# Patient Record
Sex: FEMALE | Race: BLACK OR AFRICAN AMERICAN | NOT HISPANIC OR LATINO | Employment: FULL TIME | URBAN - METROPOLITAN AREA
[De-identification: names, ages, dates, MRNs, and addresses within clinical notes are randomized per-mention and may not be internally consistent; named-entity substitution may affect disease eponyms.]

---

## 2017-02-06 ENCOUNTER — OFFICE VISIT (OUTPATIENT)
Dept: INTERNAL MEDICINE | Facility: CLINIC | Age: 22
End: 2017-02-06
Payer: COMMERCIAL

## 2017-02-06 VITALS
HEART RATE: 89 BPM | SYSTOLIC BLOOD PRESSURE: 142 MMHG | HEIGHT: 60 IN | DIASTOLIC BLOOD PRESSURE: 82 MMHG | WEIGHT: 178 LBS | BODY MASS INDEX: 34.95 KG/M2

## 2017-02-06 DIAGNOSIS — R03.0 BORDERLINE HYPERTENSION: ICD-10-CM

## 2017-02-06 DIAGNOSIS — T78.40XA ALLERGY, INITIAL ENCOUNTER: Primary | ICD-10-CM

## 2017-02-06 DIAGNOSIS — Z20.7 SCABIES EXPOSURE: ICD-10-CM

## 2017-02-06 PROCEDURE — 99999 PR PBB SHADOW E&M-EST. PATIENT-LVL III: CPT | Mod: PBBFAC,,, | Performed by: INTERNAL MEDICINE

## 2017-02-06 PROCEDURE — 99203 OFFICE O/P NEW LOW 30 MIN: CPT | Mod: S$GLB,,, | Performed by: INTERNAL MEDICINE

## 2017-02-06 RX ORDER — METHYLPREDNISOLONE 4 MG/1
TABLET ORAL
Qty: 1 PACKAGE | Refills: 0 | Status: SHIPPED | OUTPATIENT
Start: 2017-02-06 | End: 2017-04-04 | Stop reason: ALTCHOICE

## 2017-02-06 RX ORDER — IVERMECTIN 3 MG/1
15 TABLET ORAL ONCE
Qty: 10 TABLET | Refills: 0 | Status: SHIPPED | OUTPATIENT
Start: 2017-02-06 | End: 2017-02-06

## 2017-02-06 NOTE — MR AVS SNAPSHOT
[facility]  [address]  [place] [address]  Phone: [phone]  Fax: [phone]                  Jyothi Asif   2017 11:15 AM   Office Visit    Description:  Female : 1995   Provider:  Letha Gonzalez MD   Department:  [facility]           Reason for Visit     Rash           Diagnoses this Visit        Comments    Allergy, initial encounter    -  Primary     Scabies exposure                To Do List           Future Appointments        Provider Department Dept Phone    2017 12:00 PM LAB, SAME DAY [facility] [phone]    3/7/2017 8:00 AM Sherly Chavez MD [facility] All[facility]/ Immunology [phone]    2017 1:00 PM Isabel Gaviria MD [facility] [phone]      Goals (5 Years of Data)     None       These Medications        Disp Refills Start End    ivermectin (STROMECTOL) 3 mg Tab 10 tablet 0 2017    Take 5 tablets (15 mg total) by mouth once. Take 15 mg today then repeat dose in 2 weeks - Oral    Pharmacy: [facility]/pharmacy #[id number] - [place] - [address] Ph #: [phone]       methylPREDNISolone (MEDROL, MENDEL,) 4 mg tablet 1 Package 0 2017     use as directed    Pharmacy: [facility]/pharmacy #[id number] - [place], [place] - [address] [facility] Ph #: [phone]         [facility] On Call     [facility] On Call Nurse Care Line -  Assistance  Registered nurses in the [facility] provide clinical advisement, health education, appointment booking, and other advisory services.  Call for this free service at [phone].             Medications           Message regarding Medications     Verify the changes and/or additions to your medication regime listed below are the same as discussed with your clinician today.  If any of these changes or additions are incorrect, please notify your healthcare provider.        START taking these NEW medications         Refills    ivermectin (STROMECTOL) 3 mg Tab 0    Sig: Take 5 tablets (15 mg total) by mouth once. Take 15 mg today then repeat dose in 2 weeks    Class: Normal    Route: Oral    methylPREDNISolone (MEDROL, MENDEL,) 4 mg tablet 0    Sig: use as directed    Class: Normal      STOP taking these medications     tramadol (ULTRAM) 50 mg tablet     clindamycin (CLEOCIN) 300 MG capsule     nystatin (MYCOSTATIN) cream Apply topically 2 (two) times daily. APPLY TO AFFECTED AREA BID. Large area- 2 tube    clotrimazole-betamethasone 1-0.05% (LOTRISONE) cream Apply to affected area 2 times daily    clobetasol (TEMOVATE) 0.05 % cream Apply topically 2 (two) times daily. Large area- please supply with two tubes           Verify that the below list of medications is an accurate representation of the medications you are currently taking.  If none reported, the list may be blank. If incorrect, please contact your healthcare provider. Carry this list with you in case of emergency.           Current Medications     noreth-ethinyl estradiol-iron 0.8mg-25mcg(24) and 75 mg (4) Chew TAKE 1 TABLET BY MOUTH ONCE DAILY.    ivermectin (STROMECTOL) 3 mg Tab Take 5 tablets (15 mg total) by mouth once. Take 15 mg today then repeat dose in 2 weeks    methylPREDNISolone (MEDROL, MENDEL,) 4 mg tablet use as directed           Clinical Reference Information           Your Vitals Were     BP Pulse Height Weight BMI    142/82 89 5' (1.524 m) 80.7 kg (178 lb) 34.76 kg/m2      Blood Pressure          Most Recent Value    BP  (!)  142/82      Allergies as of 2/6/2017     No Known Allergies      Immunizations Administered on Date of Encounter - 2/6/2017     None      Orders Placed During Today's Visit      Normal Orders This Visit    Ambulatory consult to Allergy     Future Labs/Procedures Expected by Expires    CBC auto differential  2/6/2017 2/6/2018    Comprehensive metabolic panel  2/6/2017 2/6/2018    THYROID PEROXIDASE ANTIBODY  2/6/2017 4/7/2018    TSH   2/6/2017 2/6/2018      [facility] Sign-Up     Activating your [facility] account is as easy as 1-2-3!     1) Visit [email or web address], select Sign Up Now, enter this activation code and your date of birth, then select Next.  [email or web address]  Expires: 3/23/2017 11:50 AM      2) Create a username and password to use when you visit [facility] in the future and select a security question in case you lose your password and select Next.    3) Enter your e-mail address and click Sign Up!    Additional Information  If you have questions, please e-mail [email or web address] or call [phone] to talk to our [facility] staff. Remember, [facility] is NOT to be used for urgent needs. For medical emergencies, dial 911.         Instructions      Controlling Allergens: Dust Mites  Constant exposure to allergens means constant allergy symptoms. Thats why controlling or avoiding the allergens that cause your symptoms is an important part of your treatment. If you are allergic to dust mites, the tips below can help to lessen your exposure to dust mites.     Wash all bedding in hot water.   Dust mite allergy  Dust mites are a common cause of nasal allergies. These mites are tiny organisms that live in bedding, upholstered furniture, and carpet. They live in warm, humid conditions. House-dust mites are almost impossible to get rid of. But you can keep them under control.  Make changes to your home  Some furniture, like sofas and chairs, hold dust mites. To lessen the problem:  · Choose nonfabric upholstery, like leather or vinyl.  · Replace horizontal blinds with pull-down shades or vertical blinds.  · Use washable curtains instead of heavy drapes.  · Have as little carpeting as possible.  · Cover your mattress, box spring, and pillows in allergy-proof casings.  Housecleaning  Here are some tips:  · Wash sheets, blankets, and mattress pads every 1 to 2 weeks in hot water (at least 130°F).  · Remove stuffed animals and other things that  collect dust, such as wall hangings, knickknacks, and books--especially in the bedroom.  · Dust your home every week with a damp cloth. Vacuum once a week. Use HEPA (high efficiency particulate air) filters or double-ply bags in the vacuum . Or, use a vacuum designed to lessen allergens.  · If someone else cant dust and vacuum for you, wearing a filter mask may help.  Reduce indoor humidity  Dust mites need moist air to live. Use a dehumidifier to reduce air moisture. Dont use humidifiers, or vaporizers.  Talk with your healthcare provider about other ways to reduce dust in your home. Ask about medicines that can help with your allergy symptoms.  Date Last Reviewed: 9/1/2016 © 2000-2016 [employer]. [address] [place] [address], [place] [address]. All rights reserved. This information is not intended as a substitute for professional medical care. Always follow your healthcare professional's instructions.        Controlling Allergens: In the Home  Even a clean home can be full of allergens, so take a moment to see what you can do to cut down on allergens in each room of your home. Try to avoid things like cigarette smoke and perfume. They can irritate your eyes, nose, throat, and lungs and make your allergies worse.  · Buy an air purifier with a HEPA filter. Look in consumer magazines for recommendations. Avoid vaporizers and humidifiers, since they encourage mold and dust-mite growth.  · Use shades or vertical blinds instead of horizontal blinds, which collect dust. Replace drapes with curtains that can be washed regularly.  · Enclose mattresses, box springs, and pillows in allergy-proof casings. Use washable blankets and quilts. Avoid feather pillows, down comforters, and wool blankets.  · Avoid dust-catching clutter. Have enclosed places to keep books, toys, and clothes. Keep closet doors closed.  · Use washable throw rugs wherever possible, or have bare floors.  · Put filters over forced-air  heating vents. Change the filters regularly.  · Keep your car clean. Vacuum the seats and carpets regularly. If you have air conditioning, use it instead of opening the windows.  · Keep rain gutters clean. Remove leaves and debris that can grow mold.  · Check stored food for spoilage and mold growth. Clean up spills right away.  · Don't let wet clothing sit and grow mold. And don't hang clothes outside to dry where they can collect airborne pollen. Dry clothing immediately in a clothes dryer that's vented to the outside.  · Install a fan to keep the bathroom well ventilated.        Avoid yard work and pulling weeds. These and other outdoor activities increase your exposure to pollen. If thats not possible, wear a filter mask. When youre done, bathe, wash your hair, and change your clothes.      Date Last Reviewed: 9/1/2016  © 7834-3053 [employer]. [address] [place] [address]. All rights reserved. This information is not intended as a substitute for professional medical care. Always follow your healthcare professional's instructions.        Allergy Medicines: Over-the-Counter    You can buy many allergy medicines without a prescription. You may:  · Use the over-the-counter (OTC) alone or with prescription medicine  · Use all medicines exactly as instructed  · If you have any questions about your allergy medicine, ask your healthcare provider or your pharmacist  There are many OTC allergy medicines including antihistamines, decongestants, and steroid nasal spray. And, there are combination products. For example, a pill with both an antihistamine and a decongestant. You may also be instructed to take more than one medicine. For example, a steroid nasal spray and an antihistamine nasal spray.  Antihistamines  Antihistamines block the release of histamine, the substance released by your body that causes many allergy symptoms. They help lessen sneezing, itching, and runny  noses.   Antihistamines:  · Are available as pills, liquids, and nasal sprays.  · May cause you to be sleepy  · Should be taken as instructed  Decongestants  Decongestants reduce swelling of the nasal passages. They relieve pressure and pain in your sinuses.  Decongestants:  · Are available as pills, liquids, and nasal sprays  · Should not be used for more than a few days. Overuse can actually make your symptoms worse.  Steroid nasal sprays  Steroid nasal sprays are used for nasal allergy symptoms. They help to lessen nasal congestion and swelling, runny noses, and sneezing.  Steroid nasal sprays:  · Shouldn't be used without your provider's advice  · Can cause side effects, like nasal bleeding  · Should be sprayed into the nose correctly  Make sure you read and follow the instructions on the label. If you have any questions about these medicines, ask your healthcare provider or pharmacist.  Date Last Reviewed: 9/1/2016  © 4210-0918 [employer]. [address] [place] [address], [place] [address]. All rights reserved. This information is not intended as a substitute for professional medical care. Always follow your healthcare professional's instructions.        Scabies     To prevent spread of infection, wash clothing, linens, and toys in very hot water.     Scabies is an infection caused by very tiny mites that burrow into the skin. The mites are called Sarcoptes scabiei. They cause severe itching. Though children are most commonly infected, anyone can get scabies. Scabies mites can pass from person to person through close physical contact. They can also be passed through shared clothing, towels, and bedding. Scabies infection is not usually dangerous, but it is uncomfortable. Because it is so contagious, scabies should be treated immediately to keep the infection from spreading.  Symptoms  Symptoms of scabies appear about 2 to 6 weeks after infection in a child or adult who has never had scabies before. A  child or adult who has been infected before will experience symptoms much sooner, in 1 to 4 days. Signs of scabies infection may include:  · Intense itching, especially at night or after a hot bath  · Skin irritations that look like hives, insect bites, pimples, or blisters, especially on warmer areas of the body (such as between the fingers, in the armpits, and in the creases of the wrists, elbows, and knees)  · Sores on the body caused by scratching (the sores may become infected)  · [place] created by mites traveling under the skin, which look like lines on the skins surface  Treating scabies infection  Scabies infections are usually treated with a prescription lotion that kills the mites. The lotion must be applied to the entire body from the neck down. This includes the palms of the hands, soles of the feet, groin, and under the fingernails. The lotion must be left on for 8 to 14 hours. In some cases, a second application of lotion is needed a week after the first. Medicines work quickly, but most children and adults continue to have an itchy rash for several weeks after treatment. Marks on the skin from scabies usually go away in 1 to 2 weeks, but sometimes take a few months to clear.  Preventing spread of the infection  To prevent reinfection and the spread of scabies to others, follow these instructions:  · Wash the infected persons clothing, towels, bed linens, cloth toys, and other personal items in very hot, soapy water. Dry them thoroughly. Do not share among family members.   · Seal items that cant be washed in plastic bags for 2 weeks.  · Vacuum floors and furniture. Throw the vacuum bag away afterward.  · Notify an infected childs school and caregivers so that other children can be checked and treated.  · Keep an infected child home from  or school until the morning after treatment for scabies.  · Warn children not to share items such as clothing and towels with other children.  · You may  need to treat all household members who may have been exposed to scabies, whether they show symptoms or not. Talk with your healthcare provider.  · Do not spray your house with chemicals or pesticides. These can be dangerous to your familys health.  When to call the healthcare provider if:  · The infected person has a fever, red streaks, pain, or swelling of the skin.  · Sores get worse or do not heal.  · New rashes appear or itching continues for more than 2 weeks after treatment.   Date Last Reviewed: 6/1/2016 © 2000-2016 [employer]. [address] [place] [address]. All rights reserved. This information is not intended as a substitute for professional medical care. Always follow your healthcare professional's instructions.             Language Assistance Services     ATTENTION: Language assistance services are available, free of charge. Please call [phone].      ATENCIÓN: Si te lr, tiene a herrera disposición servicios gratuitos de asistencia lingüística. Llame al [phone].     DARNELL Ý: N?u b?n nói Ti?ng Vi?t, có các d?ch v? h? tr? ngôn ng? mi?n phí dành cho b?n. G?i s? [phone].         [facility] complies with applicable Federal civil rights laws and does not discriminate on the basis of race, color, national origin, age, disability, or sex.

## 2017-02-06 NOTE — PATIENT INSTRUCTIONS
Controlling Allergens: Dust Mites  Constant exposure to allergens means constant allergy symptoms. Thats why controlling or avoiding the allergens that cause your symptoms is an important part of your treatment. If you are allergic to dust mites, the tips below can help to lessen your exposure to dust mites.     Wash all bedding in hot water.   Dust mite allergy  Dust mites are a common cause of nasal allergies. These mites are tiny organisms that live in bedding, upholstered furniture, and carpet. They live in warm, humid conditions. House-dust mites are almost impossible to get rid of. But you can keep them under control.  Make changes to your home  Some furniture, like sofas and chairs, hold dust mites. To lessen the problem:  · Choose nonfabric upholstery, like leather or vinyl.  · Replace horizontal blinds with pull-down shades or vertical blinds.  · Use washable curtains instead of heavy drapes.  · Have as little carpeting as possible.  · Cover your mattress, box spring, and pillows in allergy-proof casings.  Housecleaning  Here are some tips:  · Wash sheets, blankets, and mattress pads every 1 to 2 weeks in hot water (at least 130°F).  · Remove stuffed animals and other things that collect dust, such as wall hangings, knickknacks, and books--especially in the bedroom.  · Dust your home every week with a damp cloth. Vacuum once a week. Use HEPA (high efficiency particulate air) filters or double-ply bags in the vacuum . Or, use a vacuum designed to lessen allergens.  · If someone else cant dust and vacuum for you, wearing a filter mask may help.  Reduce indoor humidity  Dust mites need moist air to live. Use a dehumidifier to reduce air moisture. Dont use humidifiers, or vaporizers.  Talk with your healthcare provider about other ways to reduce dust in your home. Ask about medicines that can help with your allergy symptoms.  Date Last Reviewed: 9/1/2016  © 3845-3101 [employer] [address]  [place] [address]. All rights reserved. This information is not intended as a substitute for professional medical care. Always follow your healthcare professional's instructions.        Controlling Allergens: In the Home  Even a clean home can be full of allergens, so take a moment to see what you can do to cut down on allergens in each room of your home. Try to avoid things like cigarette smoke and perfume. They can irritate your eyes, nose, throat, and lungs and make your allergies worse.  · Buy an air purifier with a HEPA filter. Look in consumer magazines for recommendations. Avoid vaporizers and humidifiers, since they encourage mold and dust-mite growth.  · Use shades or vertical blinds instead of horizontal blinds, which collect dust. Replace drapes with curtains that can be washed regularly.  · Enclose mattresses, box springs, and pillows in allergy-proof casings. Use washable blankets and quilts. Avoid feather pillows, down comforters, and wool blankets.  · Avoid dust-catching clutter. Have enclosed places to keep books, toys, and clothes. Keep closet doors closed.  · Use washable throw rugs wherever possible, or have bare floors.  · Put filters over forced-air heating vents. Change the filters regularly.  · Keep your car clean. Vacuum the seats and carpets regularly. If you have air conditioning, use it instead of opening the windows.  · Keep rain gutters clean. Remove leaves and debris that can grow mold.  · Check stored food for spoilage and mold growth. Clean up spills right away.  · Don't let wet clothing sit and grow mold. And don't hang clothes outside to dry where they can collect airborne pollen. Dry clothing immediately in a clothes dryer that's vented to the outside.  · Install a fan to keep the bathroom well ventilated.        Avoid yard work and pulling weeds. These and other outdoor activities increase your exposure to pollen. If thats not possible, wear a filter mask.  When youre done, bathe, wash your hair, and change your clothes.      Date Last Reviewed: 9/1/2016 © 2000-2016 [employer]. [address] [place] [address], [place] [address]. All rights reserved. This information is not intended as a substitute for professional medical care. Always follow your healthcare professional's instructions.        Allergy Medicines: Over-the-Counter    You can buy many allergy medicines without a prescription. You may:  · Use the over-the-counter (OTC) alone or with prescription medicine  · Use all medicines exactly as instructed  · If you have any questions about your allergy medicine, ask your healthcare provider or your pharmacist  There are many OTC allergy medicines including antihistamines, decongestants, and steroid nasal spray. And, there are combination products. For example, a pill with both an antihistamine and a decongestant. You may also be instructed to take more than one medicine. For example, a steroid nasal spray and an antihistamine nasal spray.  Antihistamines  Antihistamines block the release of histamine, the substance released by your body that causes many allergy symptoms. They help lessen sneezing, itching, and runny noses.   Antihistamines:  · Are available as pills, liquids, and nasal sprays.  · May cause you to be sleepy  · Should be taken as instructed  Decongestants  Decongestants reduce swelling of the nasal passages. They relieve pressure and pain in your sinuses.  Decongestants:  · Are available as pills, liquids, and nasal sprays  · Should not be used for more than a few days. Overuse can actually make your symptoms worse.  Steroid nasal sprays  Steroid nasal sprays are used for nasal allergy symptoms. They help to lessen nasal congestion and swelling, runny noses, and sneezing.  Steroid nasal sprays:  · Shouldn't be used without your provider's advice  · Can cause side effects, like nasal bleeding  · Should be sprayed into the nose correctly  Make  sure you read and follow the instructions on the label. If you have any questions about these medicines, ask your healthcare provider or pharmacist.  Date Last Reviewed: 9/1/2016 © 2000-2016 [employer]. [address] [place] [address], [place] [address]. All rights reserved. This information is not intended as a substitute for professional medical care. Always follow your healthcare professional's instructions.        Scabies     To prevent spread of infection, wash clothing, linens, and toys in very hot water.     Scabies is an infection caused by very tiny mites that burrow into the skin. The mites are called Sarcoptes scabiei. They cause severe itching. Though children are most commonly infected, anyone can get scabies. Scabies mites can pass from person to person through close physical contact. They can also be passed through shared clothing, towels, and bedding. Scabies infection is not usually dangerous, but it is uncomfortable. Because it is so contagious, scabies should be treated immediately to keep the infection from spreading.  Symptoms  Symptoms of scabies appear about 2 to 6 weeks after infection in a child or adult who has never had scabies before. A child or adult who has been infected before will experience symptoms much sooner, in 1 to 4 days. Signs of scabies infection may include:  · Intense itching, especially at night or after a hot bath  · Skin irritations that look like hives, insect bites, pimples, or blisters, especially on warmer areas of the body (such as between the fingers, in the armpits, and in the creases of the wrists, elbows, and knees)  · Sores on the body caused by scratching (the sores may become infected)  · Murphys created by mites traveling under the skin, which look like lines on the skins surface  Treating scabies infection  Scabies infections are usually treated with a prescription lotion that kills the mites. The lotion must be applied to the entire body from the  neck down. This includes the palms of the hands, soles of the feet, groin, and under the fingernails. The lotion must be left on for 8 to 14 hours. In some cases, a second application of lotion is needed a week after the first. Medicines work quickly, but most children and adults continue to have an itchy rash for several weeks after treatment. Marks on the skin from scabies usually go away in 1 to 2 weeks, but sometimes take a few months to clear.  Preventing spread of the infection  To prevent reinfection and the spread of scabies to others, follow these instructions:  · Wash the infected persons clothing, towels, bed linens, cloth toys, and other personal items in very hot, soapy water. Dry them thoroughly. Do not share among family members.   · Seal items that cant be washed in plastic bags for 2 weeks.  · Vacuum floors and furniture. Throw the vacuum bag away afterward.  · Notify an infected childs school and caregivers so that other children can be checked and treated.  · Keep an infected child home from  or school until the morning after treatment for scabies.  · Warn children not to share items such as clothing and towels with other children.  · You may need to treat all household members who may have been exposed to scabies, whether they show symptoms or not. Talk with your healthcare provider.  · Do not spray your house with chemicals or pesticides. These can be dangerous to your familys health.  When to call the healthcare provider if:  · The infected person has a fever, red streaks, pain, or swelling of the skin.  · Sores get worse or do not heal.  · New rashes appear or itching continues for more than 2 weeks after treatment.   Date Last Reviewed: 6/1/2016 © 2000-2016 [employer]. [address] [place] [address], [place] [address]. All rights reserved. This information is not intended as a substitute for professional medical care. Always follow your healthcare professional's  instructions.

## 2017-02-06 NOTE — PROGRESS NOTES
Subjective:       Patient ID: Jyothi Asif is a 22 y.o. female.    Chief Complaint: Rash (itching)    HPI Comments: UC appt,  [place]    Itching for 2 weeks, diffuse.  No new foods.  No rash.  No swelling of the lips or tongue.  No travel.  Nothing new at home- no new carpet or pain.  Has a dog, not new, no problems.  Graduated from school business.    Uses Dove soap.    Worse at night.     No cough or SOB.      Has tried cortisone lotion- no real help.        Rash   Pertinent negatives include no congestion, cough, fatigue, fever, shortness of breath or sore throat.     Review of Systems   Constitutional: Negative for chills, fatigue and fever.   HENT: Positive for postnasal drip. Negative for congestion, ear pain, sinus pressure, sneezing and sore throat.    Eyes: Negative.    Respiratory: Negative for cough, chest tightness, shortness of breath and wheezing.    Cardiovascular: Negative.    Gastrointestinal: Negative.    Genitourinary: Negative for frequency, menstrual problem, pelvic pain and urgency.   Skin: Positive for rash.        Itch mainly, sometimes has rash that looks like bites.  No hives.  No lip or tongue swelling       Objective:      Physical Exam   Constitutional: She is oriented to person, place, and time. She appears well-developed and well-nourished.   HENT:   Head: Normocephalic and atraumatic.   Right Ear: External ear normal.   Left Ear: External ear normal.   Mouth/Throat: Oropharynx is clear and moist.   Eyes: Conjunctivae are normal.   Neck: Normal range of motion. Neck supple. No thyromegaly present.   Cardiovascular: Normal rate, regular rhythm and normal heart sounds.    Pulmonary/Chest: No respiratory distress. She has no wheezes. She has no rales.   Abdominal: Soft. Bowel sounds are normal.   Musculoskeletal: She exhibits no edema.   Lymphadenopathy:     She has no cervical adenopathy.   Neurological: She is alert and oriented to person, place, and time.   Skin: Skin is warm  and dry. No rash noted. No erythema.   Bites and rash on arms and hands.  No groin lesions  No hives       Assessment:       1. Allergy, initial encounter    2. Scabies exposure        Plan:         Allergy, initial encounter  -     TSH; Future; Expected date: 2/6/17  -     CBC auto differential; Future; Expected date: 2/6/17  -     Comprehensive metabolic panel; Future; Expected date: 2/6/17  -     THYROID PEROXIDASE ANTIBODY; Future; Expected date: 2/6/17  -     Ambulatory consult to Allergy  -     methylPREDNISolone (MEDROL, MENDEL,) 4 mg tablet; use as directed  Dispense: 1 Package; Refill: 0    Scabies exposure: rule out- reviewed- had been in college; close quarters  -     ivermectin (STROMECTOL) 3 mg Tab; Take 5 tablets (15 mg total) by mouth once. Take 15 mg today then repeat dose in 2 weeks  Dispense: 10 tablet; Refill: 0  -     methylPREDNISolone (MEDROL, MENDEL,) 4 mg tablet; use as directed  Dispense: 1 Package; Refill: 0    Hygienic measures  Cool showers  Wash all clothes and bedding in hot water  Allergy assessment  Claritin OTC during day; benadryl at night    Consider DERM    Borderline BP:  Low salt diet, exercise, 5-10-# weight loss.  Call if BP > 135/85 on a regular basis.    I will review all studies and determine further tx depending on findings    Keep follow up with PCP

## 2017-03-07 ENCOUNTER — OFFICE VISIT (OUTPATIENT)
Dept: ALLERGY | Facility: CLINIC | Age: 22
End: 2017-03-07
Payer: COMMERCIAL

## 2017-03-07 ENCOUNTER — TELEPHONE (OUTPATIENT)
Dept: INTERNAL MEDICINE | Facility: CLINIC | Age: 22
End: 2017-03-07

## 2017-03-07 VITALS
WEIGHT: 178.38 LBS | HEART RATE: 68 BPM | DIASTOLIC BLOOD PRESSURE: 70 MMHG | SYSTOLIC BLOOD PRESSURE: 106 MMHG | TEMPERATURE: 98 F | HEIGHT: 62 IN | RESPIRATION RATE: 20 BRPM | BODY MASS INDEX: 32.82 KG/M2

## 2017-03-07 DIAGNOSIS — L29.9 PRURITUS: Primary | ICD-10-CM

## 2017-03-07 DIAGNOSIS — D64.9 NORMOCYTIC ANEMIA: ICD-10-CM

## 2017-03-07 DIAGNOSIS — J31.0 CHRONIC RHINITIS: ICD-10-CM

## 2017-03-07 PROCEDURE — 1160F RVW MEDS BY RX/DR IN RCRD: CPT | Mod: S$GLB,,, | Performed by: ALLERGY & IMMUNOLOGY

## 2017-03-07 PROCEDURE — 99203 OFFICE O/P NEW LOW 30 MIN: CPT | Mod: S$GLB,,, | Performed by: ALLERGY & IMMUNOLOGY

## 2017-03-07 PROCEDURE — 99999 PR PBB SHADOW E&M-EST. PATIENT-LVL III: CPT | Mod: PBBFAC,,, | Performed by: ALLERGY & IMMUNOLOGY

## 2017-03-07 NOTE — MR AVS SNAPSHOT
"    [facility]  [address]  [facility] [address]  Phone: [phone]  Fax: [phone]                  Jyothi Asif   3/7/2017 8:00 AM   Office Visit    Description:  Female : 1995   Provider:  Sherly Chavez MD   Department:  [facility]           Reason for Visit     Itching     Nasal Congestion           Diagnoses this Visit        Comments    Pruritus    -  Primary            To Do List           Future Appointments        Provider Department Dept Phone    3/7/2017 9:30 AM LAB, SAME DAY [facility] [phone]    2017 1:00 PM Isabel Gaviria MD [facility] - Internal Medicine [phone]      Goals (5 Years of Data)     None      [facility]     [facility] Care [facility] -  Assistance  Registered nurses in the [facility] provide clinical advisement, health education, appointment booking, and other advisory services.  Call for this free service at [phone].             Medications           Message regarding Medications     Verify the changes and/or additions to your medication regime listed below are the same as discussed with your clinician today.  If any of these changes or additions are incorrect, please notify your healthcare provider.             Verify that the below list of medications is an accurate representation of the medications you are currently taking.  If none reported, the list may be blank. If incorrect, please contact your healthcare provider. Carry this list with you in case of emergency.           Current Medications     noreth-ethinyl estradiol-iron 0.8mg-25mcg(24) and 75 mg (4) Chew TAKE 1 TABLET BY MOUTH ONCE DAILY.    methylPREDNISolone (MEDROL, MENDEL,) 4 mg tablet use as directed           Clinical Reference Information           Your Vitals Were     BP Pulse Temp Resp Height Weight    106/70 68 98.1 °F (36.7 °C) (Oral) 20 5' 1.5" (1.562 m) 80.9 kg (178 " lb 5.6 oz)    Last Period BMI             02/15/2017 33.15 kg/m2         Blood Pressure          Most Recent Value    BP  106/70      Allergies as of 3/7/2017     No Known Allergies      Immunizations Administered on Date of Encounter - 3/7/2017     None      Orders Placed During Today's Visit     Future Labs/Procedures Expected by Expires    CBC auto differential  3/7/2017 5/6/2018    Ferritin  3/7/2017 5/6/2018    HIV-1 and HIV-2 antibodies  3/7/2017 5/6/2018      [facility] Sign-Up     Activating your [facility] account is as easy as 1-2-3!     1) Visit [email or web address], select Sign Up Now, enter this activation code and your date of birth, then select Next.  [email or web address]  Expires: 3/23/2017 11:50 AM      2) Create a username and password to use when you visit [facility] in the future and select a security question in case you lose your password and select Next.    3) Enter your e-mail address and click Sign Up!    Additional Information  If you have questions, please e-mail [email or web address] or call [phone] to talk to our [facility] staff. Remember, [facility] is NOT to be used for urgent needs. For medical emergencies, dial 911.         Instructions    I will get some lab work today.  You can use chlorpheniramine, which may help with the itching.  If this is causing you to be too sleepy, you can try zyrtec.  The itching from scabies infection should resolve over time.  Please call us if you have any other questions of concerns.         Language Assistance Services     ATTENTION: Language assistance services are available, free of charge. Please call [phone].      ATENCIÓN: Si lindsayla adeline, tiene a herrera disposición servicios gratuitos de asistencia lingüística. Llame al [phone].     DARNELL Ý: N?u b?n nói Ti?ng Vi?t, có các d?ch v? h? tr? ngôn ng? mi?n phí dành cho b?n. G?i s? [phone].         [facility] complies with applicable Federal civil rights laws and does not  discriminate on the basis of race, color, national origin, age, disability, or sex.

## 2017-03-07 NOTE — PATIENT INSTRUCTIONS
I will get some lab work today.  You can use chlorpheniramine, which may help with the itching.  If this is causing you to be too sleepy, you can try zyrtec.  The itching from scabies infection should resolve over time.  Please call us if you have any other questions of concerns.

## 2017-03-07 NOTE — PROGRESS NOTES
Allergy and Immunology History and Physical    The patient has been identified by full name and date of birth.    ================================================================      Reason for Consult: Itching    CC:  Itching        HPI:  Jyothi Asif  is a 22 y.o. female previously healthy presents for evaluation of pruritus which started in January.  Patient reports pruritus started in her forearms, then spread to the armpits, and is located in the thighs and beneath her breasts.  Doesn't involve the face, neck, palms or feet.  Patient recalls a skin toned papular rash on the axilla and a few lesions on the wrist as well.  Lesions were without any purulent or clear discharge.  Patient had gone to urgent care and was diagnosed with scabies.  She has lesions in between her fingers as well.  She was given ivermectin and medrol dose with some improvement in symptoms.  Pruritus was awakening her from asleep prior to medications and is no longer awakening her from sleep.  Symptoms have gotten better over all.  Patient denies associated fevers, chills, infections.  No one else with similar symptoms.  She is sexually active with one partner, who is asymptomatic.  Patient denies high risk sexual behavior.  Hasn't been tested for HIV. She denies hives, other rashes, joint pains, diarrhea, blood in stool or urine, shortness of breath, wheezing, chest tightness.  Denies bedbugs.         Lesions in the axilla.  Picture taken from the pt's phone with her permission.       ENVIRONMENTAL HX:  Pets: 1 dog    ATOPIC HISTORY:  H/O ASTHMA:  denies  H/O RHINITIS: occasional sneezing     REVIEW OF SYSTEMS    The balance of the ROS is negative unless otherwise specified in the HPI.    Past Medical History:   Diagnosis Date    Borderline hypertension 2/6/2017       History reviewed. No pertinent surgical history.    Family History   Problem Relation Age of Onset    Diabetes Mother     Ovarian cancer Neg Hx     Colon cancer Neg  Hx     Breast cancer Neg Hx        Family AI History:  Asthma: denies  Allergic rhinitis: denies  Food allergy: denies   Immune deficiency: denies    Social History     Social History    Marital status: Single     Spouse name: N/A    Number of children: N/A    Years of education: N/A     Social History Main Topics    Smoking status: Never Smoker    Smokeless tobacco: None    Alcohol use No    Drug use: None    Sexual activity: Yes     Partners: Male     Birth control/ protection: Condom, OCP     Other Topics Concern    None     Social History Narrative       Current Outpatient Prescriptions   Medication Sig Dispense Refill    noreth-ethinyl estradiol-iron 0.8mg-25mcg(24) and 75 mg (4) Chew TAKE 1 TABLET BY MOUTH ONCE DAILY. 28 tablet 3    methylPREDNISolone (MEDROL, MENDEL,) 4 mg tablet use as directed 1 Package 0     No current facility-administered medications for this visit.        Review of patient's allergies indicates:  No Known Allergies      Vitals:    03/07/17 0823   BP: 106/70   Pulse: 68   Resp: 20   Temp: 98.1 °F (36.7 °C)        PHYSICAL EXAM:  General: NAD, alert and oriented x 3, pleasant   Head:    normocephalic, atraumatic   Eyes:    EOMI, no conjunctival injection   Ears:    B/l cerumen impaction, right canal with tissue   Nose:    2-3+ pink/pale turbinates, no polyps, ulcers or other lesions noted  Throat:  No post nasal drip, No cobblestoning, no oropharyngeal thrush   Neck:    No cervical adenopathy, no thyromegaly, no axillary lymphadenopathy   CV:      Normal S1S2, no murmurs/rubs/gallops, regular rhythm   Chest:   Clear to auscultation bilaterally, no wheezes/rales/rhonchi   Abd:     +BS, soft, nontender, nondistended, no HSM   Ext:     No cyanosis, clubbing, or edema   Skin:    Post inflammatory hyper pigmentation   Psych:   Normal affect and mood  Neuro:   No ataxia, Cranial nerves grossly intact      Lab Results   Component Value Date    WBC 3.57 (L) 02/06/2017    HGB 11.0 (L)  02/06/2017    HCT 34.7 (L) 02/06/2017    MCV 85 02/06/2017     02/06/2017     =============================================================================  ALLERGEN TESTING   ----------------    SKIN PRICK TESTING: N/A    IMMUNOCAPS: N/A    =============================================================================  PULMONARY FUNCTION  ------------------    PEAK FLOWS (CLINIC): N/A    PULM FUNCTION TESTS: N/A      ASSESSMENT/ PLAN  1.  Chronic Pruritus:  Patient was recently diagnosed with scabies s/p ivermectin and steroid taper with improvement in symptoms though still is experiencing pruritus primarily in axilla, thighs which is no longer awakening her from sleep.  Patient's CMP, TSH levels are reassurng.  However, CBC is suggestive of anemia and neutropenia.  No lymphadenopathy on physical exam and ROS is reassuring.  Will repeat CBC, ferritin and patient in agreement with checking HIV.  Will call patient with these results.  Can use chlorpheniramine 4mg as needed for pruritus.  If too sedating, can try using zyrtec 10mg daily for pruritus.  Typical skin cycle is approximately 6 cycles, and pruritus should improve over time.  If symptoms, including skin lesions persist, may need to consult dermatology for skin biopsy.     2.  Chronic Rhinitis:  Symptoms are not bothersome.  If symptoms become bothersome in the future, can consider skin testing in the future.      3.  Return to clinic as needed.     Jyothi was seen today for itching and nasal congestion.    Diagnoses and all orders for this visit:    Pruritus  -     CBC auto differential; Future  -     Ferritin; Future  -     HIV-1 and HIV-2 antibodies; Future    Chronic rhinitis    Normocytic anemia          Sherly Scott, DO   Allergy&Immunology Fellow   PGY-5  03/07/2017

## 2017-03-13 ENCOUNTER — TELEPHONE (OUTPATIENT)
Dept: ALLERGY | Facility: CLINIC | Age: 22
End: 2017-03-13

## 2017-04-04 ENCOUNTER — LAB VISIT (OUTPATIENT)
Dept: LAB | Facility: HOSPITAL | Age: 22
End: 2017-04-04
Attending: INTERNAL MEDICINE
Payer: COMMERCIAL

## 2017-04-04 ENCOUNTER — OFFICE VISIT (OUTPATIENT)
Dept: INTERNAL MEDICINE | Facility: CLINIC | Age: 22
End: 2017-04-04
Payer: COMMERCIAL

## 2017-04-04 VITALS
HEART RATE: 83 BPM | WEIGHT: 172 LBS | BODY MASS INDEX: 32.47 KG/M2 | SYSTOLIC BLOOD PRESSURE: 116 MMHG | HEIGHT: 61 IN | DIASTOLIC BLOOD PRESSURE: 82 MMHG

## 2017-04-04 DIAGNOSIS — D50.9 IRON DEFICIENCY ANEMIA, UNSPECIFIED: ICD-10-CM

## 2017-04-04 DIAGNOSIS — Z83.3 FAMILY HISTORY OF TYPE 2 DIABETES MELLITUS IN MOTHER: ICD-10-CM

## 2017-04-04 DIAGNOSIS — Z00.00 ROUTINE MEDICAL EXAM: ICD-10-CM

## 2017-04-04 DIAGNOSIS — Z00.00 ROUTINE MEDICAL EXAM: Primary | ICD-10-CM

## 2017-04-04 PROBLEM — R03.0 BORDERLINE HYPERTENSION: Status: RESOLVED | Noted: 2017-02-06 | Resolved: 2017-04-04

## 2017-04-04 LAB
CHOLEST/HDLC SERPL: 3.1 {RATIO}
HDL/CHOLESTEROL RATIO: 32.3 %
HDLC SERPL-MCNC: 155 MG/DL
HDLC SERPL-MCNC: 50 MG/DL
LDLC SERPL CALC-MCNC: 94.8 MG/DL
NONHDLC SERPL-MCNC: 105 MG/DL
TRIGL SERPL-MCNC: 51 MG/DL

## 2017-04-04 PROCEDURE — 80061 LIPID PANEL: CPT

## 2017-04-04 PROCEDURE — 36415 COLL VENOUS BLD VENIPUNCTURE: CPT

## 2017-04-04 PROCEDURE — 99395 PREV VISIT EST AGE 18-39: CPT | Mod: S$GLB,,, | Performed by: INTERNAL MEDICINE

## 2017-04-04 PROCEDURE — 83036 HEMOGLOBIN GLYCOSYLATED A1C: CPT

## 2017-04-04 PROCEDURE — 99999 PR PBB SHADOW E&M-EST. PATIENT-LVL III: CPT | Mod: PBBFAC,,, | Performed by: INTERNAL MEDICINE

## 2017-04-04 RX ORDER — FERROUS SULFATE 325(65) MG
325 TABLET ORAL
Qty: 90 TABLET | Refills: 1 | Status: SHIPPED | OUTPATIENT
Start: 2017-04-04 | End: 2024-03-20

## 2017-04-04 NOTE — PROGRESS NOTES
Subjective:       Patient ID: Jyothi Asif is a 22 y.o. female.    Chief Complaint: Establish Care    HPI Comments: She is new to me. Seen once urgently two months ago for pruritis with few skin lesions resembling insect bites. Treated with a Medrol dosepack and Stromectol for scabies with improvement. Had f/u with the [facility], no new recommendations. Presents to establish care, for annual physical. No acute complaints. Skin condition improved.     PMH: G0.  Iron Deficiency Anemia recently diagnosed.   Pelvic exam here 1/16, Pap not done. Eye exam 2016. Flu shot 2015.   Labs 2-3/17: H/H 11/34.7, MCV 85, Ferritin 9, CMP normal, TSH 1.107, TPO Antibodies normal <6.0, HIV negative.     PSH: None.     Social: Non-smoker, occasional alcohol. Single, no children.  for S&WB.     FMH: DM in mother and MGF. Anemia in MGM.     NKDA.    Medications: Oral contraceptives.           Review of Systems   Constitutional: Negative for activity change, appetite change, fatigue, fever and unexpected weight change.   HENT: Negative for congestion, hearing loss, rhinorrhea, sneezing, sore throat, trouble swallowing and voice change.    Eyes: Negative for pain, redness, itching and visual disturbance.   Respiratory: Negative for cough, chest tightness, shortness of breath and wheezing.    Cardiovascular: Negative for chest pain, palpitations and leg swelling.   Gastrointestinal: Negative for abdominal pain, blood in stool, constipation, diarrhea, nausea and vomiting.   Genitourinary: Negative for difficulty urinating, dysuria, flank pain, frequency, hematuria, menstrual problem and urgency.   Musculoskeletal: Negative for arthralgias, joint swelling, myalgias and neck pain.        Mild intermittent low back pain with prolonged standing, with no radicular symptoms.    Skin: Negative for color change and rash.   Neurological: Negative for dizziness, syncope, facial asymmetry, speech difficulty, weakness,  "numbness and headaches.   Hematological: Negative for adenopathy. Does not bruise/bleed easily.   Psychiatric/Behavioral: Negative for decreased concentration, dysphoric mood and sleep disturbance. The patient is not nervous/anxious.        Objective:    /82, Pulse 83, Ht 5' 1", Wt 172 lbs, BMI=32.5  Physical Exam   Constitutional: She is oriented to person, place, and time. She appears well-developed and well-nourished. No distress.   HENT:   Head: Normocephalic and atraumatic.   Right Ear: External ear normal.   Left Ear: External ear normal.   Nose: Nose normal.   Mouth/Throat: Oropharynx is clear and moist. No oropharyngeal exudate.   Eyes: Conjunctivae and EOM are normal. Pupils are equal, round, and reactive to light. Right conjunctiva is not injected. Left conjunctiva is not injected. No scleral icterus.   Neck: Normal range of motion. Neck supple. No JVD present. No thyromegaly present.   Cardiovascular: Normal rate, regular rhythm, normal heart sounds and intact distal pulses.  Exam reveals no gallop and no friction rub.    No murmur heard.  Pulmonary/Chest: Effort normal and breath sounds normal. No respiratory distress. She has no wheezes. She has no rhonchi. She has no rales.   Abdominal: Soft. Bowel sounds are normal. She exhibits no distension and no mass. There is no hepatosplenomegaly. There is no tenderness. There is no CVA tenderness. No hernia.   Musculoskeletal: Normal range of motion. She exhibits no edema, tenderness or deformity.   Lymphadenopathy:     She has no cervical adenopathy.   Neurological: She is alert and oriented to person, place, and time. She has normal strength. No cranial nerve deficit. She exhibits normal muscle tone. Coordination and gait normal.   Skin: Skin is warm and dry. No lesion and no rash noted. She is not diaphoretic. No cyanosis or erythema. No pallor. Nails show no clubbing.   Psychiatric: She has a normal mood and affect. Her behavior is normal. Judgment " and thought content normal.   Vitals reviewed.      Assessment:       1. Routine medical exam    2. Iron deficiency anemia, unspecified    3. Family history of type 2 diabetes mellitus in mother        Plan:       Routine medical exam  -     Lipid panel; Future; Expected date: 4/4/17    Iron deficiency anemia, unspecified  -     ferrous sulfate 325 mg (65 mg iron) Tab tablet; Take 1 tablet (325 mg total) by mouth daily with breakfast.  Dispense: 90 tablet; Refill: 1    Family history of type 2 diabetes mellitus in mother  -     Hemoglobin A1c; Future; Expected date: 4/4/17    Counseled on diet and exercise for weight reduction, diabetes prevention and cardiovascular health.

## 2017-04-04 NOTE — MR AVS SNAPSHOT
[facility]  [address]  [facility] [address]  Phone: [phone]  Fax: [phone]                  Jyothi Asif   2017 1:00 PM   Office Visit    Description:  Female : 1995   Provider:  Isabel Gaviria MD   Department:  [facility]           Reason for Visit     Establish Care           Diagnoses this Visit        Comments    Routine medical exam    -  Primary     Iron deficiency anemia, unspecified         Family history of type 2 diabetes mellitus in mother                To Do List           Future Appointments        Provider Department Dept Phone    2017 2:00 PM LAB, APPOINTMENT [facility] [phone]      Goals (5 Years of Data)     None      Follow-Up and Disposition     Return in about 1 year (around 2018).    Follow-up and Disposition History       These Medications        Disp Refills Start End    ferrous sulfate 325 mg (65 mg iron) Tab tablet 90 tablet 1 2017     Take 1 tablet (325 mg total) by mouth daily with breakfast. - Oral    Pharmacy: [facility]/pharmacy #[id number] - [place] - [address] [facility] Ph #: [phone]         [facility]     [facility] Care Line -  Assistance  Unless otherwise directed by your provider, please contact [facility], our nurse care line that is available for  assistance.     Registered nurses in the [facility] provide: appointment scheduling, clinical advisement, health education, and other advisory services.  Call: [phone] (toll free)               Medications           Message regarding Medications     Verify the changes and/or additions to your medication regime listed below are the same as discussed with your clinician today.  If any of these changes or additions are incorrect, please notify your healthcare provider.        START taking these NEW medications        Refills    ferrous sulfate 325 mg (65  "mg iron) Tab tablet 1    Sig: Take 1 tablet (325 mg total) by mouth daily with breakfast.    Class: Normal    Route: Oral      STOP taking these medications     methylPREDNISolone (MEDROL, MENDEL,) 4 mg tablet use as directed           Verify that the below list of medications is an accurate representation of the medications you are currently taking.  If none reported, the list may be blank. If incorrect, please contact your healthcare provider. Carry this list with you in case of emergency.           Current Medications     noreth-ethinyl estradiol-iron 0.8mg-25mcg(24) and 75 mg (4) Chew TAKE 1 TABLET BY MOUTH ONCE DAILY.    ferrous sulfate 325 mg (65 mg iron) Tab tablet Take 1 tablet (325 mg total) by mouth daily with breakfast.           Clinical Reference Information           Your Vitals Were     BP Pulse Height Weight Last Period BMI    116/82 83 5' 1" (1.549 m) 78 kg (172 lb) 02/15/2017 32.5 kg/m2      Blood Pressure          Most Recent Value    BP  116/82      Allergies as of 4/4/2017     No Known Allergies      Immunizations Administered on Date of Encounter - 4/4/2017     None      Orders Placed During Today's Visit     Future Labs/Procedures Expected by Expires    Hemoglobin A1c  4/4/2017 4/4/2018    Lipid panel  4/4/2017 4/4/2018      [facility] Sign-Up     Activating your [facility] account is as easy as 1-2-3!     1) Visit [email or web address], select Sign Up Now, enter this activation code and your date of birth, then select Next.  [email or web address]  Expires: 5/19/2017  1:46 PM      2) Create a username and password to use when you visit [facility] in the future and select a security question in case you lose your password and select Next.    3) Enter your e-mail address and click Sign Up!    Additional Information  If you have questions, please e-mail [email or web address] or call [phone] to talk to our [facility] staff. Remember, [facility] is NOT to be used for urgent needs. For medical emergencies, dial " 911.         Language Assistance Services     ATTENTION: Language assistance services are available, free of charge. Please call [phone].      ATENCIÓN: Si habla camposañol, tiene a herrera disposición servicios gratuitos de asistencia lingüística. Llame al [phone].     CHÚ Ý: N?u b?n nói Ti?ng Vi?t, có các d?ch v? h? tr? ngôn ng? mi?n phí dành cho b?n. G?i s? [phone].         [facility] complies with applicable Federal civil rights laws and does not discriminate on the basis of race, color, national origin, age, disability, or sex.

## 2017-04-05 LAB
ESTIMATED AVG GLUCOSE: 108 MG/DL
HBA1C MFR BLD HPLC: 5.4 %

## 2017-06-19 ENCOUNTER — OFFICE VISIT (OUTPATIENT)
Dept: OBSTETRICS AND GYNECOLOGY | Facility: CLINIC | Age: 22
End: 2017-06-19
Payer: COMMERCIAL

## 2017-06-19 VITALS
DIASTOLIC BLOOD PRESSURE: 70 MMHG | WEIGHT: 169.75 LBS | BODY MASS INDEX: 32.05 KG/M2 | SYSTOLIC BLOOD PRESSURE: 110 MMHG | HEIGHT: 61 IN

## 2017-06-19 DIAGNOSIS — Z01.419 ENCOUNTER FOR GYNECOLOGICAL EXAMINATION WITHOUT ABNORMAL FINDING: Primary | ICD-10-CM

## 2017-06-19 DIAGNOSIS — Z30.9 ENCOUNTER FOR CONTRACEPTIVE MANAGEMENT, UNSPECIFIED TYPE: ICD-10-CM

## 2017-06-19 PROCEDURE — 99999 PR PBB SHADOW E&M-EST. PATIENT-LVL II: CPT | Mod: PBBFAC,,, | Performed by: OBSTETRICS & GYNECOLOGY

## 2017-06-19 PROCEDURE — 99395 PREV VISIT EST AGE 18-39: CPT | Mod: S$GLB,,, | Performed by: OBSTETRICS & GYNECOLOGY

## 2017-06-19 NOTE — PROGRESS NOTES
HISTORY OF PRESENT ILLNESS:    Jyothi Asif is a 22 y.o. female, , Patient's last menstrual period was 2017.,  presents for a routine exam and has no complaints.    The use of the oral contraceptive, Depo Provera, Nexplanon, Nuva Ring and IUD has been fully discussed with the patient. Warnings about anticipated minor side effects such as breakthrough spotting, nausea, breast tenderness, weight changes, acne, headaches, etc. She has been told of the more serious potential side effects such as MI, stroke, and deep vein thrombosis, all of which are very unlikely. She has been asked to report any signs of such serious problems immediately.  The need for additional protection, such as a condom, while taking antibiotics and to prevent exposure to sexually transmitted diseases has also been discussed- the patient has been clearly reminded that contraception cannot protect her against diseases such as HIV and others. She understands and wishes to have Nexplanon placed.       Past Medical History:   Diagnosis Date    Borderline hypertension 2017       History reviewed. No pertinent surgical history.    MEDICATIONS AND ALLERGIES:      Current Outpatient Prescriptions:     ferrous sulfate 325 mg (65 mg iron) Tab tablet, Take 1 tablet (325 mg total) by mouth daily with breakfast., Disp: 90 tablet, Rfl: 1    noreth-ethinyl estradiol-iron 0.8mg-25mcg(24) and 75 mg (4) Chew, TAKE 1 TABLET BY MOUTH ONCE DAILY., Disp: 28 tablet, Rfl: 3    Review of patient's allergies indicates:  No Known Allergies    Family History   Problem Relation Age of Onset    Diabetes Mother     Ovarian cancer Neg Hx     Colon cancer Neg Hx     Breast cancer Neg Hx        Social History     Social History    Marital status: Single     Spouse name: N/A    Number of children: N/A    Years of education: N/A     Occupational History    Not on file.     Social History Main Topics    Smoking status: Never Smoker    Smokeless  "tobacco: Not on file    Alcohol use No    Drug use: Unknown    Sexual activity: Yes     Partners: Male     Birth control/ protection: Condom, OCP     Other Topics Concern    Not on file     Social History Narrative    No narrative on file       COMPREHENSIVE GYN HISTORY:  PAP History: Denies abnormal Paps.  Infection History: Denies STDs. Denies PID.  Benign History: Denies uterine fibroids. Denies ovarian cysts. Denies endometriosis. Denies other conditions.  Cancer History: Denies cervical cancer. Denies uterine cancer or hyperplasia. Denies ovarian cancer. Denies vulvar cancer or pre-cancer. Denies vaginal cancer or pre-cancer. Denies breast cancer. Denies colon cancer.  Sexual Activity History: Reports currently being sexually active  Menstrual History: Monthly. Mod then light flow.   Dysmenorrhea History: Reports mild dysmenorrhea.       ROS:  GENERAL: No weight changes. No swelling. No fatigue. No fever.  CARDIOVASCULAR: No chest pain. No shortness of breath. No leg cramps.   NEUROLOGICAL: No headaches. No vision changes.  BREASTS: No pain. No lumps. No discharge.  ABDOMEN: No pain. No nausea. No vomiting. No diarrhea. No constipation.  REPRODUCTIVE: No abnormal bleeding.   VULVA: No pain. No lesions. No itching.  VAGINA: No relaxation. No itching. No odor. No discharge. No lesions.  URINARY: No incontinence. No nocturia. No frequency. No dysuria.    /70   Ht 5' 1" (1.549 m)   Wt 77 kg (169 lb 12.1 oz)   LMP 06/05/2017   BMI 32.07 kg/m²     PE:  APPEARANCE: Well nourished, well developed, in no acute distress.  AFFECT: WNL, alert and oriented x 3.  SKIN: No acne or hirsutism.  NECK: Neck symmetric, without masses or thyromegaly.  NODES: No inguinal, cervical, axillary or femoral lymph node enlargement.  CHEST: Good respiratory effort.   ABDOMEN: Soft. No tenderness or masses. No hepatosplenomegaly. No hernias.  BREASTS: Symmetrical, no skin changes, visible lesions, palpable masses or nipple " discharge bilaterally.  PELVIC: External female genitalia without lesions.  Female hair distribution. Adequate perineal body, Normal urethral meatus. Vagina moist and well rugated without lesions or discharge.  No significant cystocele or rectocele present. Cervix pink without lesions, discharge or tenderness. Uterus is 4-6 week size, regular, mobile and nontender. Adnexa without masses or tenderness.  EXTREMITIES: No edema    DIAGNOSIS:  1. Encounter for gynecological examination without abnormal finding    2. Encounter for contraceptive management, unspecified type           COUNSELING:  The patient was counseled today on:  -A.C.S. Pap and pelvic exam guidelines (pap every 3 years), recomendations for yearly mammogram;  -to follow up with her PCP for other health maintenance.    FOLLOW-UP with me annually.   Patient to follow up with NP Alie for Nexplanon placement

## 2017-08-07 ENCOUNTER — OFFICE VISIT (OUTPATIENT)
Dept: PODIATRY | Facility: CLINIC | Age: 22
End: 2017-08-07
Payer: COMMERCIAL

## 2017-08-07 VITALS
DIASTOLIC BLOOD PRESSURE: 76 MMHG | SYSTOLIC BLOOD PRESSURE: 117 MMHG | HEART RATE: 67 BPM | WEIGHT: 169 LBS | HEIGHT: 61 IN | BODY MASS INDEX: 31.91 KG/M2

## 2017-08-07 DIAGNOSIS — M79.674 TOE PAIN, RIGHT: Primary | ICD-10-CM

## 2017-08-07 DIAGNOSIS — S90.424A: ICD-10-CM

## 2017-08-07 DIAGNOSIS — M79.676 PAIN DUE TO ONYCHOMYCOSIS OF TOENAIL: ICD-10-CM

## 2017-08-07 DIAGNOSIS — B35.1 PAIN DUE TO ONYCHOMYCOSIS OF TOENAIL: ICD-10-CM

## 2017-08-07 DIAGNOSIS — L60.0 INGROWN NAIL: ICD-10-CM

## 2017-08-07 PROCEDURE — 99999 PR PBB SHADOW E&M-EST. PATIENT-LVL III: CPT | Mod: PBBFAC,,, | Performed by: PODIATRIST

## 2017-08-07 PROCEDURE — 99203 OFFICE O/P NEW LOW 30 MIN: CPT | Mod: 25,S$GLB,, | Performed by: PODIATRIST

## 2017-08-07 PROCEDURE — 11730 AVULSION NAIL PLATE SIMPLE 1: CPT | Mod: T8,S$GLB,, | Performed by: PODIATRIST

## 2017-08-07 PROCEDURE — 3008F BODY MASS INDEX DOCD: CPT | Mod: S$GLB,,, | Performed by: PODIATRIST

## 2017-08-07 RX ORDER — LIDOCAINE HYDROCHLORIDE 20 MG/ML
1 INJECTION, SOLUTION EPIDURAL; INFILTRATION; INTRACAUDAL; PERINEURAL ONCE
Status: COMPLETED | OUTPATIENT
Start: 2017-08-07 | End: 2017-08-07

## 2017-08-07 RX ORDER — TOBRAMYCIN 3 MG/ML
1 SOLUTION/ DROPS OPHTHALMIC 2 TIMES DAILY
Qty: 5 ML | Refills: 0 | Status: SHIPPED | OUTPATIENT
Start: 2017-08-07 | End: 2024-03-20

## 2017-08-07 RX ORDER — TERBINAFINE HYDROCHLORIDE 250 MG/1
250 TABLET ORAL DAILY
Qty: 40 TABLET | Refills: 0 | Status: SHIPPED | OUTPATIENT
Start: 2017-08-07 | End: 2017-09-05 | Stop reason: SDUPTHER

## 2017-08-07 RX ORDER — BUPIVACAINE HYDROCHLORIDE 5 MG/ML
1 INJECTION, SOLUTION EPIDURAL; INTRACAUDAL ONCE
Status: COMPLETED | OUTPATIENT
Start: 2017-08-07 | End: 2017-08-07

## 2017-08-07 RX ADMIN — BUPIVACAINE HYDROCHLORIDE 5 MG: 5 INJECTION, SOLUTION EPIDURAL; INTRACAUDAL at 04:08

## 2017-08-07 RX ADMIN — LIDOCAINE HYDROCHLORIDE 20 MG: 20 INJECTION, SOLUTION EPIDURAL; INFILTRATION; INTRACAUDAL; PERINEURAL at 04:08

## 2017-08-07 NOTE — PATIENT INSTRUCTIONS
"Recommend lotions: eucerin, aquaphor, A&D ointment, gold bond for diabetics, sween; urea 40 with aloe (found on amazon.com)    Shoe recommendations: (try 6pm.com, zappos.[email or web address] , [email or web address].[email or web address], or shoes.com for discounted prices) you can visit DSW shoes in [place] as well    Asics (GT 2000 or gel foundations), new balance, saucony (stabil c3),  Goodson (GTS or Beast or transcend), vionic, propet (tennis shoe)    sofft brand, clarks, crocs, aerosoles, naturalizers, SAS, ecco, carlos, yeny jay, rockports (dress shoes)    Vionic, burkenstocks, fitflops, propet (sandals)    Nike comfort thong sandals, crocs, propet (house shoes)      Ingrown Toenail, Excised  An ingrown toenail occurs when the nail grows sideways into the skin alongside the nail. This can cause pain, especially when wearing tight shoes. It can also lead to an infection with redness and swelling.  The side of the nail will need to be removed in order to stop the pain and release any infection present. If there is a lot of redness and swelling, then an antibiotic may also be used. The redness and pain should begin to go away within 48 hours. It will take about two weeks for the exposed nail bed to become dry and all of the swelling to go down.  If only the side of the nail was removed it will begin to grow back in a few months. To prevent recurrence, that side of nail bed may be treated with a strong chemical to prevent the nail from regrowing ("ablation").  Home care  The following guidelines will help you care for your toe at home:  · Once a day beginning in 24 hours::   ¨ Clean the toe separate from your body with warm running water and antibacterial soap such as dial   ¨ Clean any remaining crust away with soap and water using a cotton-tipped applicator.  ¨ Apply tobrex drop  ¨ Cover with a breathable bandage or until the exposed nail bed is dry and there is no more drainage.  · Change the dressing daily, or whenever it becomes wet or dirty.  · If you " were prescribed antibiotics, take them as directed until they are all gone.  · Wear comfortable shoes with a lot of toe room, or open-toe sandals, while your toe is healing.  · You may use acetaminophen or ibuprofen to control pain, unless another medicine was prescribed. If you have chronic liver or kidney disease or ever had a stomach ulcer or GI bleeding, talk with your doctor before using these medicines.  Prevention  To prevent ingrown toenails:  · Wear shoes that fit well. Avoid shoes that pinch the toes together.  · When you trim your toenails, do not cut them too short. Cut straight across at the top and do not round the edges.  · Do not use a sharp object to clean under your nail since this might cause an infection.  · At first signs of a recurrence, insert a small piece of cotton under that side of the nail to help it grow out straight.  Follow-up care  Follow up with your doctor or this facility as advised by our staff. If the ingrown toenail recurs, follow up with a podiatrist for nail bed ablation.  When to seek medical advice  Call your health care provider right away if any of the following occur:  · Increasing redness, pain or swelling of the toe  · Red streaks in the skin leading away from the wound  · Continued pus or fluid drainage for more than 24 hours  · Fever of 100.4º F (38º C) or higher, or as directed by your health care provider  © 5061-2245 [employer]. [address] [place] [address], [place] [address]. All rights reserved. This information is not intended as a substitute for professional medical care. Always follow your healthcare professional's instructions.

## 2017-08-07 NOTE — PROGRESS NOTES
Subjective:      Patient ID: Jyothi Asif is a 22 y.o. female.    Chief Complaint: Ingrown Toenail (right foot 4th toe pcp Dr. Gaviria 2mos agos)    Jyothi Asif is a 22 y.o. female who presents to the clinic complaining of painful right lateral 4th digit toenail. Patient has attempted self treatment with soaking.  Pain worse at night and with compression. This is a initial problem that was first noted on Friday while getting a pedicure.  Patient  confirms drainage.    Current shoe gear: sandals      Patient Active Problem List   Diagnosis    Iron deficiency anemia, unspecified       Current Outpatient Prescriptions on File Prior to Visit   Medication Sig Dispense Refill    ferrous sulfate 325 mg (65 mg iron) Tab tablet Take 1 tablet (325 mg total) by mouth daily with breakfast. 90 tablet 1    [DISCONTINUED] noreth-ethinyl estradiol-iron 0.8mg-25mcg(24) and 75 mg (4) Chew TAKE 1 TABLET BY MOUTH ONCE DAILY. 28 tablet 3     No current facility-administered medications on file prior to visit.        Review of patient's allergies indicates:  No Known Allergies    History reviewed. No pertinent surgical history.    Family History   Problem Relation Age of Onset    Diabetes Mother     Ovarian cancer Neg Hx     Colon cancer Neg Hx     Breast cancer Neg Hx        Social History     Social History    Marital status: Single     Spouse name: N/A    Number of children: N/A    Years of education: N/A     Occupational History    Not on file.     Social History Main Topics    Smoking status: Never Smoker    Smokeless tobacco: Never Used    Alcohol use No    Drug use: Unknown    Sexual activity: Yes     Partners: Male     Birth control/ protection: Condom, OCP     Other Topics Concern    Not on file     Social History Narrative    No narrative on file             Review of Systems   Constitution: Negative for chills, fever and weakness.   Cardiovascular: Negative for claudication and leg swelling.  "  Respiratory: Negative for cough and shortness of breath.    Skin: Positive for nail changes. Negative for dry skin, itching and rash.   Musculoskeletal: Positive for back pain and joint pain. Negative for falls, joint swelling and muscle weakness.   Gastrointestinal: Negative for diarrhea, nausea and vomiting.   Neurological: Negative for numbness, paresthesias and tremors.   Psychiatric/Behavioral: Negative for altered mental status and hallucinations.           Objective:       Vitals:    08/07/17 0907   BP: 117/76   Pulse: 67   Weight: 76.7 kg (169 lb)   Height: 5' 1" (1.549 m)   PainSc: 0-No pain       Physical Exam   Constitutional:  Non-toxic appearance. She does not have a sickly appearance. No distress.   Cardiovascular:   Pulses:       Dorsalis pedis pulses are 2+ on the right side, and 2+ on the left side.        Posterior tibial pulses are 2+ on the right side, and 2+ on the left side.   dorsalis pedis and posterior tibial pulses are palpable bilaterally. Capillary refill time is within normal limits.   Pulmonary/Chest: No respiratory distress.   Musculoskeletal:        Right ankle: She exhibits normal range of motion and no swelling. No tenderness. No lateral malleolus, no medial malleolus, no AITFL, no CF ligament and no posterior TFL tenderness found. Achilles tendon exhibits no pain, no defect and normal Land's test results.        Left ankle: She exhibits normal range of motion and no swelling. No tenderness. No lateral malleolus, no medial malleolus, no AITFL, no CF ligament and no posterior TFL tenderness found. Achilles tendon exhibits no pain, no defect and normal Land's test results.        Right foot: There is no tenderness and no bony tenderness.        Left foot: There is no tenderness and no bony tenderness.   Neurological: She has normal reflexes. She displays no atrophy and no tremor. No sensory deficit. She exhibits normal muscle tone.   [place]-Jessy 5.07 monofilament is " intact bilateral feet. Sharp/dull sensation is also intact Bilateral feet.     Skin: Skin is warm, dry and intact. No bruising, no burn, no laceration and no rash noted. She is not diaphoretic. No cyanosis. No pallor. Nails show no clubbing.   Tenderness lateral margin and proximal nail fold of the right 4th digit foot with ingrown nail plate and blistering. Surrounding erythema and minimal edema is noted there is no granuloma formation noted. no malodor        Psychiatric: Her mood appears not anxious. Her affect is not inappropriate. Her speech is not slurred. She is not combative. She is communicative. She is attentive.   Nursing note reviewed.            Assessment:       Encounter Diagnoses   Name Primary?    Toe pain, right Yes    Blister of fourth toe, right, initial encounter     Pain due to onychomycosis of toenail     Ingrown nail          Plan:       Jyothi was seen today for ingrown toenail.    Diagnoses and all orders for this visit:    Toe pain, right  -     Hepatic function panel; Future    Blister of fourth toe, right, initial encounter  -     Hepatic function panel; Future    Pain due to onychomycosis of toenail  -     Hepatic function panel; Future    Ingrown nail    Other orders  -     tobramycin sulfate 0.3% (TOBREX) 0.3 % ophthalmic solution; Place 1 drop into the right eye 2 (two) times daily. Apply to nail bed  -     terbinafine HCl (LAMISIL) 250 mg tablet; Take 1 tablet (250 mg total) by mouth once daily. take 1st month then return for blood work. Avoid alcohol while on medication  -     lidocaine (PF) 20 mg/ml (2%) injection 20 mg; 1 mL (20 mg total) by Other route once.  -     bupivacaine (PF) 0.5% (5 mg/ml) injection 5 mg; Inject 1 mL (5 mg total) into the skin once.      I counseled the patient on her conditions, their implications and medical management.      We discussed using Lamisil for onychomycosis. This drug offers a fairly high but not universal cure rate. A 12 week  treatment course is recommended. The patient is aware that rare cases of liver injury have been reported; and agrees to come in for liver function tests prior to rx and again at ~5 weeks of treatment. The symptoms of liver disease have been discussed; call if such occurs. In addition, some insurance plans do not cover the expense of this drug for treating a cosmetic condition, and the patient understands they may have to pay for the medication. Other side effects, such as headaches and rashes, have also been discussed.    Instructed patient on the importance of keeping feet dry. Patient instructed to use absorbent cotton socks and change them if they become sweaty; or wear an open-toe shoe or sandal. Wash the feet at least once a day with soap and water. Patient instructed to use lysol or over-the-counter antifungal powders or sprays to shoes daily and allow them to air dry, switching shoes from every other day would be optimal. Patient is to avoid barefoot walking in high-risk environments (public showers, gyms and locker rooms) may prevent future infections.     Patient to RTC if:  Increasing redness or swelling of the foot   Pus draining from cracks in the skin   Fever of 100.4ºF (38ºC) or higher    Discussed treatment options with patient. Options included soaking, partial avulsion and matrixectomy. Risks and benefits discussed and all questions were answered. The patient wishes to proceed with  Partial nail avulsions.  An informed consent was obtained.    PREOPERATIVE DIAGNOSIS: blister at right 4th digit proximal nail fold      POSTOPERATIVE DIAGNOSIS: blister at right 4th digit proximal nail fold    NAME OF THE PROCEDURE:  nail avulsion and bull aspiration at right 4th digit proximal nail fold      SURGEON: Dr. Shazia Bauer    No surgical assist.     ESTIMATED BLOOD LOSS: Minimal, being less than 1 mL.     HEMOSTASIS: Anatomic dissection, direct pressure manually.     ANESTHESIA: 3 cc of 1:1 mix of 2%  lidocaine plain and 0.5% marcaine plain     PROCEDURE IN DETAIL: After digital block, the toe was scrubbed with betadine. Attention was directed to the blister at right 4th digit proximal nail fold, using a sterile hemostat, the nail was freed from all soft tissue attachments and removed in total. Upon nail removal blister was also opened and sanguinous drainage noted.  Hemostasis was easily affected with direct pressure.  The wound was rinsed with sterile normal saline, blotted dry and dressed with a thin layer   of antibiotic cream and a dry sterile dressing of 2 x 2s, and light compression with Coban.     The patient was given a prescription for tobramycin drops 0.3% for twice daily application to the wound andgiven verbal and written post procedure instructions and is to keep it covered at all times. Follow in this office in two weeks for reevaluation, sooner p.r.n. if she experiences fever, chills, night sweats, nausea, vomiting, redness, pain out of proportion, drainage, pus or malodor of the surgical toe.

## 2017-08-21 ENCOUNTER — OFFICE VISIT (OUTPATIENT)
Dept: PODIATRY | Facility: CLINIC | Age: 22
End: 2017-08-21
Payer: COMMERCIAL

## 2017-08-21 VITALS
HEART RATE: 73 BPM | WEIGHT: 169 LBS | HEIGHT: 61 IN | DIASTOLIC BLOOD PRESSURE: 78 MMHG | SYSTOLIC BLOOD PRESSURE: 114 MMHG | BODY MASS INDEX: 31.91 KG/M2

## 2017-08-21 DIAGNOSIS — Z98.890 S/P NAIL SURGERY: ICD-10-CM

## 2017-08-21 DIAGNOSIS — S90.424A: Primary | ICD-10-CM

## 2017-08-21 DIAGNOSIS — B35.1 PAIN DUE TO ONYCHOMYCOSIS OF TOENAIL: ICD-10-CM

## 2017-08-21 DIAGNOSIS — M79.676 PAIN DUE TO ONYCHOMYCOSIS OF TOENAIL: ICD-10-CM

## 2017-08-21 PROCEDURE — 99213 OFFICE O/P EST LOW 20 MIN: CPT | Mod: S$GLB,,, | Performed by: PODIATRIST

## 2017-08-21 PROCEDURE — 99999 PR PBB SHADOW E&M-EST. PATIENT-LVL III: CPT | Mod: PBBFAC,,, | Performed by: PODIATRIST

## 2017-08-21 PROCEDURE — 3008F BODY MASS INDEX DOCD: CPT | Mod: S$GLB,,, | Performed by: PODIATRIST

## 2017-08-21 NOTE — PATIENT INSTRUCTIONS
Recommend lotions: eucerin, aquaphor, A&D ointment, gold bond for diabetics, sween; urea 40 with aloe (found on amazon.com)    Shoe recommendations: (try 6pm.com, zappos.P[employer] , nordstromrack.[employer], or shoes[employer] for discounted prices) you can visit DSW shoes in [place] as well    Asics (GT 2000 or gel foundations), new balance, saucony (stabil c3),  Goodson (GTS or Beast or transcend), vionic, propet (tennis shoe)    sofft brand, clarks, crocs, aerosoles, naturalizers, SAS, ecco, carlos, yeny jay, rockports (dress shoes)    Vionic, burkenstocks, fitflops, propet (sandals)    Nike comfort thong sandals, crocs, propet (house shoes)    Nail Home remedy:  Vicks Vapor rub to nails for easier managability    Occasional soaks for 15-20 mins in luke warm water with 1 cup of listerine and 1 cup of apple cider vinegar are ok You may add several drops of oil of oregano or tea tree oil as well      Athletes Foot     Athletes Foot is caused by a fungal infection in the skin. It affects the skin between the toes where it causes fissures (cracks in the skin). It can also affect the bottom of the foot where it causes dry white scales and peeling of the skin. This infection is more likely to occur when the foot is in hot, sweaty socks and shoes for long periods of time.   This infection is treated with skin creams or oral medicine.     Home Care:   It is important to keep the feet dry. Use absorbent cotton socks and change them if they become sweaty; or wear an open-toe shoe or sandal. Wash the feet at least once a day with soap and water.   Rotate your shoes. If you must wear the same shoes everyday then spray the shoes with lysol or antifungal spray and allow that to dry overnight before wearing the shoes again  Apply the antifungal cream as prescribed. Some antifungal creams are available without a prescription (Lotrimin, Tinactin).   It may take a week before the rash starts to improve and it can take about three to four weeks to  completely clear. Continue the medicine until the rash is all gone.   Use over-the-counter antifungal powders or sprays on your feet after exposure to high-risk environments (public showers, gyms and locker rooms) may prevent future infections. You may wish to use appropriate footwear to reduce exposure.  Clean tubs and bathroom floor with bleach  Clean feet with Nizoral shampoo or dial antibacterial soap and then dry completely.    Follow Up   with your doctor as recommended by our staff if the rash is not starting to improve after TEN days of treatment, or if the rash continues to spread.     Get Prompt Medical Attention   if any of the following occur:   Increasing redness or swelling of the foot   Pus draining from cracks in the skin   Fever of 100.4ºF (38ºC) or higher, or as directed by your healthcare provider    © 7609-1935 [employer], [address] [place] [address], [place] [address]. All rights reserved. This information is not intended as a substitute for professional medical care. Always follow your healthcare professional's instructions.

## 2017-08-21 NOTE — PROGRESS NOTES
SUBJECTIVE: Patient returns to the clinic 2 weeks status post nail procedure.  Patient has no complaints of fever chills or sweats.  Minimal pain.  Patient has been dressing as instructed.     PAST MEDICAL HISTORY: Unchanged    Physical examination: General: Pt. is in no acute distress, alert and oriented x 3.    Podiatric examination: Vascular:Capillary refill time is within normal limits.  Dermatologic: Surgical site is clean without any signs of infection. minimal drainage.  No significant erythema.    IMPRESSION: 2 weeks postop nail procedure with satisfactory progress no signs of infection.    PLAN: Patient to continue local care until completely healed with no drainage and no redness.  Patient should call the clinic immediately if any signs of infection such as fever chills sweats increased redness or pain but was otherwise discharged.

## 2017-09-05 ENCOUNTER — LAB VISIT (OUTPATIENT)
Dept: LAB | Facility: HOSPITAL | Age: 22
End: 2017-09-05
Attending: PODIATRIST
Payer: COMMERCIAL

## 2017-09-05 DIAGNOSIS — B35.1 PAIN DUE TO ONYCHOMYCOSIS OF TOENAIL: ICD-10-CM

## 2017-09-05 DIAGNOSIS — M79.674 TOE PAIN, RIGHT: ICD-10-CM

## 2017-09-05 DIAGNOSIS — S90.424A: ICD-10-CM

## 2017-09-05 DIAGNOSIS — M79.676 PAIN DUE TO ONYCHOMYCOSIS OF TOENAIL: ICD-10-CM

## 2017-09-05 LAB
ALBUMIN SERPL BCP-MCNC: 3.7 G/DL
ALP SERPL-CCNC: 50 U/L
ALT SERPL W/O P-5'-P-CCNC: 11 U/L
AST SERPL-CCNC: 18 U/L
BILIRUB DIRECT SERPL-MCNC: 0.1 MG/DL
BILIRUB SERPL-MCNC: 0.3 MG/DL
PROT SERPL-MCNC: 7.9 G/DL

## 2017-09-05 PROCEDURE — 80076 HEPATIC FUNCTION PANEL: CPT

## 2017-09-05 PROCEDURE — 36415 COLL VENOUS BLD VENIPUNCTURE: CPT | Mod: PO

## 2017-09-05 RX ORDER — TERBINAFINE HYDROCHLORIDE 250 MG/1
250 TABLET ORAL DAILY
Qty: 50 TABLET | Refills: 0 | Status: SHIPPED | OUTPATIENT
Start: 2017-09-05 | End: 2024-03-20

## 2017-09-06 ENCOUNTER — TELEPHONE (OUTPATIENT)
Dept: PODIATRY | Facility: CLINIC | Age: 22
End: 2017-09-06

## 2017-09-06 NOTE — TELEPHONE ENCOUNTER
----- Message from Shazia Bauer DPM sent at 9/5/2017  4:35 PM CDT -----  Please inform patient that  the duration of Lamisil for toe nail fungus, rx sent to pharmacy

## 2019-07-03 NOTE — PROGRESS NOTES
Patient examined, record reviewed, agree with findings and recommendations as documented above.   English

## 2021-08-19 ENCOUNTER — OFFICE VISIT (OUTPATIENT)
Dept: URGENT CARE | Facility: CLINIC | Age: 26
End: 2021-08-19
Payer: MEDICAID

## 2021-08-19 VITALS
HEART RATE: 87 BPM | HEIGHT: 60 IN | OXYGEN SATURATION: 98 % | BODY MASS INDEX: 35.34 KG/M2 | TEMPERATURE: 98 F | WEIGHT: 180 LBS | DIASTOLIC BLOOD PRESSURE: 97 MMHG | RESPIRATION RATE: 18 BRPM | SYSTOLIC BLOOD PRESSURE: 132 MMHG

## 2021-08-19 DIAGNOSIS — M77.8 TENDONITIS OF WRIST, RIGHT: Primary | ICD-10-CM

## 2021-08-19 PROBLEM — E66.9 OBESITY: Status: ACTIVE | Noted: 2020-10-12

## 2021-08-19 PROBLEM — B00.9 HSV INFECTION: Status: ACTIVE | Noted: 2020-10-12

## 2021-08-19 PROBLEM — E05.90 HYPERTHYROIDISM, SUBCLINICAL: Status: ACTIVE | Noted: 2020-10-12

## 2021-08-19 PROCEDURE — 99203 PR OFFICE/OUTPT VISIT, NEW, LEVL III, 30-44 MIN: ICD-10-PCS | Mod: S$GLB,,, | Performed by: PHYSICIAN ASSISTANT

## 2021-08-19 PROCEDURE — 99203 OFFICE O/P NEW LOW 30 MIN: CPT | Mod: S$GLB,,, | Performed by: PHYSICIAN ASSISTANT

## 2021-08-19 RX ORDER — NAPROXEN 500 MG/1
500 TABLET ORAL 2 TIMES DAILY WITH MEALS
Qty: 20 TABLET | Refills: 0 | Status: SHIPPED | OUTPATIENT
Start: 2021-08-19 | End: 2021-08-29

## 2023-07-13 ENCOUNTER — OFFICE VISIT (OUTPATIENT)
Dept: ENDOCRINOLOGY | Facility: CLINIC | Age: 28
End: 2023-07-13
Payer: COMMERCIAL

## 2023-07-13 VITALS
TEMPERATURE: 98 F | SYSTOLIC BLOOD PRESSURE: 138 MMHG | WEIGHT: 160 LBS | DIASTOLIC BLOOD PRESSURE: 87 MMHG | BODY MASS INDEX: 31.25 KG/M2 | HEART RATE: 92 BPM

## 2023-07-13 DIAGNOSIS — E05.90 HYPERTHYROIDISM, SUBCLINICAL: Primary | ICD-10-CM

## 2023-07-13 PROCEDURE — 3008F PR BODY MASS INDEX (BMI) DOCUMENTED: ICD-10-PCS | Mod: CPTII,S$GLB,, | Performed by: HOSPITALIST

## 2023-07-13 PROCEDURE — 1160F PR REVIEW ALL MEDS BY PRESCRIBER/CLIN PHARMACIST DOCUMENTED: ICD-10-PCS | Mod: CPTII,S$GLB,, | Performed by: HOSPITALIST

## 2023-07-13 PROCEDURE — 99204 PR OFFICE/OUTPT VISIT, NEW, LEVL IV, 45-59 MIN: ICD-10-PCS | Mod: S$GLB,,, | Performed by: HOSPITALIST

## 2023-07-13 PROCEDURE — 3075F SYST BP GE 130 - 139MM HG: CPT | Mod: CPTII,S$GLB,, | Performed by: HOSPITALIST

## 2023-07-13 PROCEDURE — 1160F RVW MEDS BY RX/DR IN RCRD: CPT | Mod: CPTII,S$GLB,, | Performed by: HOSPITALIST

## 2023-07-13 PROCEDURE — 3079F PR MOST RECENT DIASTOLIC BLOOD PRESSURE 80-89 MM HG: ICD-10-PCS | Mod: CPTII,S$GLB,, | Performed by: HOSPITALIST

## 2023-07-13 PROCEDURE — 3079F DIAST BP 80-89 MM HG: CPT | Mod: CPTII,S$GLB,, | Performed by: HOSPITALIST

## 2023-07-13 PROCEDURE — 1159F PR MEDICATION LIST DOCUMENTED IN MEDICAL RECORD: ICD-10-PCS | Mod: CPTII,S$GLB,, | Performed by: HOSPITALIST

## 2023-07-13 PROCEDURE — 3008F BODY MASS INDEX DOCD: CPT | Mod: CPTII,S$GLB,, | Performed by: HOSPITALIST

## 2023-07-13 PROCEDURE — 1159F MED LIST DOCD IN RCRD: CPT | Mod: CPTII,S$GLB,, | Performed by: HOSPITALIST

## 2023-07-13 PROCEDURE — 3075F PR MOST RECENT SYSTOLIC BLOOD PRESS GE 130-139MM HG: ICD-10-PCS | Mod: CPTII,S$GLB,, | Performed by: HOSPITALIST

## 2023-07-13 PROCEDURE — 99999 PR PBB SHADOW E&M-EST. PATIENT-LVL III: ICD-10-PCS | Mod: PBBFAC,,, | Performed by: HOSPITALIST

## 2023-07-13 PROCEDURE — 99999 PR PBB SHADOW E&M-EST. PATIENT-LVL III: CPT | Mod: PBBFAC,,, | Performed by: HOSPITALIST

## 2023-07-13 PROCEDURE — 99204 OFFICE O/P NEW MOD 45 MIN: CPT | Mod: S$GLB,,, | Performed by: HOSPITALIST

## 2023-07-13 NOTE — ASSESSMENT & PLAN NOTE
- Differential diagnosis includes thyroiditis including Graves, TPO negative antibody in the past  - Clinically: symptoms are reported above, hyperthyroidism with symptoms  - Pathophysiology of hyperthyroidism, the role of TSH, free T4 were explained to patient  - No medication contributing, no tobacco use  - Check TSH, FT4, TSI ab/TRab soon pending results may need to start a ATD  - ultrasound thyroid 01/2023 read review: Thyroiditis.  No nodules, normal thyroid size  - follow-up on lab work  - All questions were answered.

## 2023-07-13 NOTE — PROGRESS NOTES
"Subjective:      Patient ID: Jyothi Asif is a 28 y.o. female presented to [facility] on 7/13/2023.  Chief Complaint:  Thyroid Problem    History of Present Illness: Jyothi Asif is a 28 y.o. female here for  hyperthyroidism  No other significant past medical history:   PCP: Daughter of daniel, Dr: Jose Angel Richter-May      Here for evaluation of abnormal thyroid blood  - patient with noted to have suppressed TSH and elevated free T4 12/15/2022: TSH: <0.01, [place] T4: 8.3  - concerning for hyperthyroidism  - with symptoms Weight loss, "lost a lot of weight" decrease from 180> 130 in 1-2 months, Weight now as increase 160   - Did not get ATD  - Family history thyroid disorder: no  - Family history of thyroid cancer: no  - Hx of Thyroid goiter: no  - Tobacco user: no  - Gave birth in 2021    Current symptoms:  Started 12/2022, ongoing at this time  No   Yes  []    []   Weight gain  []    [x]   Anxiety  []    [x]   Poor Sleep  [x]    []   Hair loss  [x]    []   Brittle nails  []    [x]   Frequent bowel movements, more than usual  []    [x]   Heat intolerance  []    [x]   Palpitation   [x]    []   Vision issue, Dry eyes  [x]    []   Recent severe illness  []    [x]   Neck swelling, pain, pressure    Medications:  [x]    []   Lithium Use  [x]    []   Amiodarone use  [x]    []   Chemotherapy   [x]    []   Radiation Treatment  [x]    []   Estrogen therapy  [x]    []   Supplements:  That includes: Kelp, Iodine, Biotin, Selenium, Sea garcia, Bladder wrack    Reproductive Issue:  []    []   Galactorrhea  [x]    []   Infertility/abnormal menstruation  []    [x]   Recent pregnancy  []    []   Decreased libido, erectile dysfunction    US thyroid, outside record  1/10/2023  Multiple transverse and sagittal grayscale sonographic images were acquired through the thyroid gland. The right lobe measures 5.7 x 3.0 x 2.9 cm, the left lobe measures 5.5 x 3.3 x 2.9 cm, and the thyroid isthmus is maximally " 1.0 cm thick. The entire thyroid gland parenchyma is diffusely heterogeneous with increased vascularity by Doppler. No discrete focal lesions are identified.    IMPRESSION:   Heterogeneous thyroid parenchyma with increased vascularity is consistent with thyroiditis.    Thyroid lab work  Lab Results   Component Value Date    TSH 1.107 02/06/2017      Antibodies  Lab Results   Component Value Date    THYROPEROXID <6.0 02/06/2017      Reviewed past surgical, medical, family, social history and updated as appropriate.  Review of Systems: see HPI above  Objective:   /87   Pulse 92   Temp 98.3 °F (36.8 °C) (Oral)   Wt 72.6 kg (160 lb)   BMI 31.25 kg/m²   Body mass index is 31.25 kg/m².  Vital signs reviewed    Physical Exam  Vitals and nursing note reviewed.   Constitutional:       Appearance: Normal appearance. She is well-developed. She is not ill-appearing.   Neck:      Thyroid: No thyromegaly.      Comments: Goiter noted on exam  Pulmonary:      Effort: Pulmonary effort is normal. No respiratory distress.   Musculoskeletal:         General: Normal range of motion.      Cervical back: Normal range of motion.   Neurological:      General: No focal deficit present.      Mental Status: She is alert. Mental status is at baseline.   Psychiatric:         Mood and Affect: Mood normal.         Behavior: Behavior normal.       Lab Reviewed:  See results in subjective  Lab Results   Component Value Date    HGBA1C 5.4 04/04/2017     Lab Results   Component Value Date    CHOL 155 04/04/2017    HDL 50 04/04/2017    LDLCALC 94.8 04/04/2017    TRIG 51 04/04/2017    CHOLHDL 32.3 04/04/2017     Lab Results   Component Value Date     02/06/2017    K 3.9 02/06/2017     02/06/2017    CO2 27 02/06/2017    GLU 87 02/06/2017    BUN 10 02/06/2017    CREATININE 0.9 02/06/2017    CALCIUM 9.3 02/06/2017    PROT 7.9 09/05/2017    ALBUMIN 3.7 09/05/2017    BILITOT 0.3 09/05/2017    ALKPHOS 50 (L) 09/05/2017    AST 18  09/05/2017    ALT 11 09/05/2017    ANIONGAP 5 (L) 02/06/2017    ESTGFRAFRICA >60.0 02/06/2017    EGFRNONAA >60.0 02/06/2017    TSH 1.107 02/06/2017     Assessment     1. Hyperthyroidism, subclinical  TSH    T4, Free    T3    Thyroid Stimulating Immunoglobulin    COMPREHENSIVE METABOLIC PANEL         Plan     Hyperthyroidism, subclinical  - Differential diagnosis includes thyroiditis including Graves, TPO negative antibody in the past  - Clinically: symptoms are reported above, hyperthyroidism with symptoms  - Pathophysiology of hyperthyroidism, the role of TSH, free T4 were explained to patient  - No medication contributing, no tobacco use  - Check TSH, FT4, TSI ab/TRab soon pending results may need to start a ATD  - ultrasound thyroid 01/2023 read review: Thyroiditis.  No nodules, normal thyroid size  - follow-up on lab work  - All questions were answered.      Advised patient to follow up with PCP for routine health maintenance care.   RTC in pending workup, 4-5 months with lab work prior if ATD is needed    Adonay Lambert M.D.  Endocrinology  [facility]  7/13/2023      Disclaimer: This note has been generated in part with the use of voice-recognition software. There may be typographical errors that have been missed during proof-reading.

## 2023-07-14 ENCOUNTER — LAB VISIT (OUTPATIENT)
Dept: LAB | Facility: HOSPITAL | Age: 28
End: 2023-07-14
Attending: HOSPITALIST
Payer: COMMERCIAL

## 2023-07-14 DIAGNOSIS — E05.90 HYPERTHYROIDISM, SUBCLINICAL: ICD-10-CM

## 2023-07-14 LAB
ALBUMIN SERPL BCP-MCNC: 3.6 G/DL (ref 3.5–5.2)
ALP SERPL-CCNC: 111 U/L (ref 55–135)
ALT SERPL W/O P-5'-P-CCNC: 28 U/L (ref 10–44)
ANION GAP SERPL CALC-SCNC: 6 MMOL/L (ref 8–16)
AST SERPL-CCNC: 21 U/L (ref 10–40)
BILIRUB SERPL-MCNC: 0.4 MG/DL (ref 0.1–1)
BUN SERPL-MCNC: 10 MG/DL (ref 6–20)
CALCIUM SERPL-MCNC: 10 MG/DL (ref 8.7–10.5)
CHLORIDE SERPL-SCNC: 109 MMOL/L (ref 95–110)
CO2 SERPL-SCNC: 23 MMOL/L (ref 23–29)
CREAT SERPL-MCNC: 0.6 MG/DL (ref 0.5–1.4)
EST. GFR  (NO RACE VARIABLE): >60 ML/MIN/1.73 M^2
GLUCOSE SERPL-MCNC: 104 MG/DL (ref 70–110)
POTASSIUM SERPL-SCNC: 3.9 MMOL/L (ref 3.5–5.1)
PROT SERPL-MCNC: 7.3 G/DL (ref 6–8.4)
SODIUM SERPL-SCNC: 138 MMOL/L (ref 136–145)
T3 SERPL-MCNC: >500 NG/DL (ref 60–180)
T4 FREE SERPL-MCNC: 2.59 NG/DL (ref 0.71–1.51)
TSH SERPL DL<=0.005 MIU/L-ACNC: <0.01 UIU/ML (ref 0.4–4)

## 2023-07-14 PROCEDURE — 36415 COLL VENOUS BLD VENIPUNCTURE: CPT | Performed by: HOSPITALIST

## 2023-07-14 PROCEDURE — 80053 COMPREHEN METABOLIC PANEL: CPT | Performed by: HOSPITALIST

## 2023-07-14 PROCEDURE — 84445 ASSAY OF TSI GLOBULIN: CPT | Performed by: HOSPITALIST

## 2023-07-14 PROCEDURE — 84443 ASSAY THYROID STIM HORMONE: CPT | Performed by: HOSPITALIST

## 2023-07-14 PROCEDURE — 84439 ASSAY OF FREE THYROXINE: CPT | Performed by: HOSPITALIST

## 2023-07-14 PROCEDURE — 84480 ASSAY TRIIODOTHYRONINE (T3): CPT | Performed by: HOSPITALIST

## 2023-07-17 LAB — TSI SER-ACNC: 11.6 IU/L

## 2023-07-28 ENCOUNTER — PATIENT MESSAGE (OUTPATIENT)
Dept: ENDOCRINOLOGY | Facility: CLINIC | Age: 28
End: 2023-07-28
Payer: COMMERCIAL

## 2023-07-28 DIAGNOSIS — E05.00 GRAVES' DISEASE: ICD-10-CM

## 2023-07-28 DIAGNOSIS — E05.90 HYPERTHYROIDISM: Primary | ICD-10-CM

## 2023-07-28 DIAGNOSIS — E05.90 HYPERTHYROIDISM, SUBCLINICAL: ICD-10-CM

## 2023-07-28 RX ORDER — METHIMAZOLE 10 MG/1
10 TABLET ORAL DAILY
Qty: 30 TABLET | Refills: 4 | Status: SHIPPED | OUTPATIENT
Start: 2023-07-28 | End: 2024-01-10

## 2023-08-01 ENCOUNTER — PATIENT MESSAGE (OUTPATIENT)
Dept: ENDOCRINOLOGY | Facility: CLINIC | Age: 28
End: 2023-08-01
Payer: COMMERCIAL

## 2023-10-27 ENCOUNTER — LAB VISIT (OUTPATIENT)
Dept: LAB | Facility: HOSPITAL | Age: 28
End: 2023-10-27
Attending: HOSPITALIST
Payer: COMMERCIAL

## 2023-10-27 DIAGNOSIS — E05.90 HYPERTHYROIDISM: ICD-10-CM

## 2023-10-27 DIAGNOSIS — E05.00 GRAVES' DISEASE: ICD-10-CM

## 2023-10-27 LAB
T3 SERPL-MCNC: 140 NG/DL (ref 60–180)
T4 FREE SERPL-MCNC: 1.15 NG/DL (ref 0.71–1.51)
TSH SERPL DL<=0.005 MIU/L-ACNC: <0.01 UIU/ML (ref 0.4–4)

## 2023-10-27 PROCEDURE — 84443 ASSAY THYROID STIM HORMONE: CPT | Performed by: HOSPITALIST

## 2023-10-27 PROCEDURE — 36415 COLL VENOUS BLD VENIPUNCTURE: CPT | Performed by: HOSPITALIST

## 2023-10-27 PROCEDURE — 84445 ASSAY OF TSI GLOBULIN: CPT | Performed by: HOSPITALIST

## 2023-10-27 PROCEDURE — 84439 ASSAY OF FREE THYROXINE: CPT | Performed by: HOSPITALIST

## 2023-10-27 PROCEDURE — 84480 ASSAY TRIIODOTHYRONINE (T3): CPT | Performed by: HOSPITALIST

## 2023-10-30 LAB — TSI SER-ACNC: 11.9 IU/L

## 2023-10-31 ENCOUNTER — PATIENT MESSAGE (OUTPATIENT)
Dept: ENDOCRINOLOGY | Facility: CLINIC | Age: 28
End: 2023-10-31
Payer: COMMERCIAL

## 2023-12-15 ENCOUNTER — OFFICE VISIT (OUTPATIENT)
Dept: ENDOCRINOLOGY | Facility: CLINIC | Age: 28
End: 2023-12-15
Payer: COMMERCIAL

## 2023-12-15 VITALS
HEART RATE: 80 BPM | DIASTOLIC BLOOD PRESSURE: 78 MMHG | WEIGHT: 180.38 LBS | BODY MASS INDEX: 35.23 KG/M2 | SYSTOLIC BLOOD PRESSURE: 126 MMHG | TEMPERATURE: 98 F

## 2023-12-15 DIAGNOSIS — E05.90 HYPERTHYROIDISM, SUBCLINICAL: Primary | ICD-10-CM

## 2023-12-15 DIAGNOSIS — E05.00 GRAVES' DISEASE: ICD-10-CM

## 2023-12-15 PROCEDURE — 99214 PR OFFICE/OUTPT VISIT, EST, LEVL IV, 30-39 MIN: ICD-10-PCS | Mod: S$GLB,,, | Performed by: HOSPITALIST

## 2023-12-15 PROCEDURE — 99999 PR PBB SHADOW E&M-EST. PATIENT-LVL III: ICD-10-PCS | Mod: PBBFAC,,, | Performed by: HOSPITALIST

## 2023-12-15 PROCEDURE — 99999 PR PBB SHADOW E&M-EST. PATIENT-LVL III: CPT | Mod: PBBFAC,,, | Performed by: HOSPITALIST

## 2023-12-15 PROCEDURE — 3078F PR MOST RECENT DIASTOLIC BLOOD PRESSURE < 80 MM HG: ICD-10-PCS | Mod: CPTII,S$GLB,, | Performed by: HOSPITALIST

## 2023-12-15 PROCEDURE — 3074F SYST BP LT 130 MM HG: CPT | Mod: CPTII,S$GLB,, | Performed by: HOSPITALIST

## 2023-12-15 PROCEDURE — 1159F PR MEDICATION LIST DOCUMENTED IN MEDICAL RECORD: ICD-10-PCS | Mod: CPTII,S$GLB,, | Performed by: HOSPITALIST

## 2023-12-15 PROCEDURE — 1159F MED LIST DOCD IN RCRD: CPT | Mod: CPTII,S$GLB,, | Performed by: HOSPITALIST

## 2023-12-15 PROCEDURE — 3008F BODY MASS INDEX DOCD: CPT | Mod: CPTII,S$GLB,, | Performed by: HOSPITALIST

## 2023-12-15 PROCEDURE — 3008F PR BODY MASS INDEX (BMI) DOCUMENTED: ICD-10-PCS | Mod: CPTII,S$GLB,, | Performed by: HOSPITALIST

## 2023-12-15 PROCEDURE — 3078F DIAST BP <80 MM HG: CPT | Mod: CPTII,S$GLB,, | Performed by: HOSPITALIST

## 2023-12-15 PROCEDURE — 1160F PR REVIEW ALL MEDS BY PRESCRIBER/CLIN PHARMACIST DOCUMENTED: ICD-10-PCS | Mod: CPTII,S$GLB,, | Performed by: HOSPITALIST

## 2023-12-15 PROCEDURE — 3074F PR MOST RECENT SYSTOLIC BLOOD PRESSURE < 130 MM HG: ICD-10-PCS | Mod: CPTII,S$GLB,, | Performed by: HOSPITALIST

## 2023-12-15 PROCEDURE — 99214 OFFICE O/P EST MOD 30 MIN: CPT | Mod: S$GLB,,, | Performed by: HOSPITALIST

## 2023-12-15 PROCEDURE — 1160F RVW MEDS BY RX/DR IN RCRD: CPT | Mod: CPTII,S$GLB,, | Performed by: HOSPITALIST

## 2023-12-15 NOTE — ASSESSMENT & PLAN NOTE
- Patient with positive TSI antibody, hyperthyroidism symptoms started 12/2022.  - Clinically: symptoms are reported above, hyperthyroidism with symptoms  - Pathophysiology of hyperthyroidism, the role of TSH, free T4 were explained to patient  - No medication contributing, no tobacco use  - ultrasound thyroid 01/2023 read review: Thyroiditis.  No nodules, normal thyroid size  - continue methimazole 10 mg daily.  Monitor lab work soon will just dose.    - will continue follow-up with lab work in 3 months as well to monitor  - All questions were answered.

## 2023-12-15 NOTE — PROGRESS NOTES
"Subjective:      Patient ID: Jyothi Asif is a 28 y.o. female presented to [facility] on 12/15/2023.  Chief Complaint:  Hyperthyroidism    History of Present Illness: Jyothi Asif is a 28 y.o. female here for  hyperthyroidism  No other significant past medical history:   PCP: Daughter of daniel, : Jose Angel Richter-May    Interval history:  Patient is here for hyperthyroidism follow-up.  Doing well.  Compliant with methimazole 10 mg daily   Sleeping better.  Anxiety has improved.  Denies anymore palpitation.  Still with heat intolerance.  Weight has increased.  Goiter has improved in size.      Hyperthyroidism/Graves disease  - Patient with noted to have suppressed TSH and elevated free T4 12/15/2022: TSH: <0.01, [place]tree T4: 8.3  - Positive for TSI antibody:  Negative TPO: Consistent with Graves disease  - On initial evaluation by me 07/2023:  Will start on ADD.  Hyperthyroidism since 12/2022.  With symptoms below  - with symptoms Weight loss, "lost a lot of weight" decrease from 180> 130 in 1-2 months, Weight now as increase 160   - Did not get ATD  - Family history thyroid disorder: no  - Family history of thyroid cancer: no  - Hx of Thyroid goiter: no  - Tobacco user: no  - Gave birth in 2021    Current symptoms:  Started 12/2022, ongoing at this time  No   Yes  []    []   Weight gain  []    [x]   Anxiety  []    [x]   Poor Sleep  [x]    []   Hair loss  [x]    []   Brittle nails  []    [x]   Frequent bowel movements, more than usual  []    [x]   Heat intolerance  []    [x]   Palpitation   [x]    []   Vision issue, Dry eyes  [x]    []   Recent severe illness  []    [x]   Neck swelling, pain, pressure    Medications:  [x]    []   Lithium Use  [x]    []   Amiodarone use  [x]    []   Chemotherapy   [x]    []   Radiation Treatment  [x]    []   Estrogen therapy  [x]    []   Supplements:  That includes: Kelp, Iodine, Biotin, Selenium, Sea garcia, Bladder wrack    Reproductive Issue:  []  "   []   Galactorrhea  [x]    []   Infertility/abnormal menstruation  []    [x]   Recent pregnancy  []    []   Decreased libido, erectile dysfunction    US thyroid, outside record  1/10/2023  Multiple transverse and sagittal grayscale sonographic images were acquired through the thyroid gland. The right lobe measures 5.7 x 3.0 x 2.9 cm, the left lobe measures 5.5 x 3.3 x 2.9 cm, and the thyroid isthmus is maximally 1.0 cm thick. The entire thyroid gland parenchyma is diffusely heterogeneous with increased vascularity by Doppler. No discrete focal lesions are identified.    IMPRESSION:   Heterogeneous thyroid parenchyma with increased vascularity is consistent with thyroiditis.    Thyroid lab work  Lab Results   Component Value Date    TSH <0.010 (L) 10/27/2023    TSH <0.010 (L) 07/14/2023    TSH 1.107 02/06/2017    FREET4 1.15 10/27/2023    FREET4 2.59 (H) 07/14/2023    U4HRKDI 140 10/27/2023    B5LGCQV >500 (H) 07/14/2023      Antibodies  Lab Results   Component Value Date    THYROPEROXID <6.0 02/06/2017    THYROIDSTIMI 11.90 (H) 10/27/2023    THYROIDSTIMI 11.60 (H) 07/14/2023      Reviewed past surgical, medical, family, social history and updated as appropriate.  Review of Systems: see HPI above  Objective:   /78   Pulse 80   Temp 98.4 °F (36.9 °C) (Oral)   Wt 81.8 kg (180 lb 6.4 oz)   BMI 35.23 kg/m²   Body mass index is 35.23 kg/m².  Vital signs reviewed    Physical Exam  Vitals and nursing note reviewed.   Constitutional:       Appearance: Normal appearance. She is well-developed. She is not ill-appearing.   Neck:      Thyroid: No thyromegaly.      Comments: Goiter noted on exam  Pulmonary:      Effort: Pulmonary effort is normal. No respiratory distress.   Musculoskeletal:         General: Normal range of motion.      Cervical back: Normal range of motion.   Neurological:      General: No focal deficit present.      Mental Status: She is alert. Mental status is at baseline.   Psychiatric:         Mood  and Affect: Mood normal.         Behavior: Behavior normal.       Lab Reviewed:  See results in subjective  Lab Results   Component Value Date    HGBA1C 5.4 04/04/2017     Lab Results   Component Value Date    CHOL 155 04/04/2017    HDL 50 04/04/2017    LDLCALC 94.8 04/04/2017    TRIG 51 04/04/2017    CHOLHDL 32.3 04/04/2017     Lab Results   Component Value Date     07/14/2023    K 3.9 07/14/2023     07/14/2023    CO2 23 07/14/2023     07/14/2023    BUN 10 07/14/2023    CREATININE 0.6 07/14/2023    CALCIUM 10.0 07/14/2023    PROT 7.3 07/14/2023    ALBUMIN 3.6 07/14/2023    BILITOT 0.4 07/14/2023    ALKPHOS 111 07/14/2023    AST 21 07/14/2023    ALT 28 07/14/2023    ANIONGAP 6 (L) 07/14/2023    ESTGFRAFRICA >105 01/19/2021    EGFRNONAA >60.0 02/06/2017    TSH <0.010 (L) 10/27/2023     Assessment     1. Hyperthyroidism, subclinical  TSH    T4, Free    TSH    T4, Free    T3    Thyroid Stimulating Immunoglobulin      2. Graves' disease  Thyroid Stimulating Immunoglobulin        Plan     Hyperthyroidism, subclinical  - Patient with positive TSI antibody, hyperthyroidism symptoms started 12/2022.  - Clinically: symptoms are reported above, hyperthyroidism with symptoms  - Pathophysiology of hyperthyroidism, the role of TSH, free T4 were explained to patient  - No medication contributing, no tobacco use  - ultrasound thyroid 01/2023 read review: Thyroiditis.  No nodules, normal thyroid size  - continue methimazole 10 mg daily.  Monitor lab work soon will just dose.    - will continue follow-up with lab work in 3 months as well to monitor  - All questions were answered.    Graves' disease  - Patient with Graves disease, hyperthyroidism.  See treatment above  - US reviewed in clinic and reviewed with patient  - Advise monitor eye symptoms, recommend eye exam yearly   - Recommend OTC trial of selenium report 3 months      Advised patient to follow up with PCP for routine health maintenance care.   RTC in  pending workup, 4 months with lab work prior if ATD is needed    Adonay Lambert M.D.  Endocrinology  [facility]  12/15/2023      Disclaimer: This note has been generated in part with the use of voice-recognition software. There may be typographical errors that have been missed during proof-reading.

## 2023-12-15 NOTE — ASSESSMENT & PLAN NOTE
- Patient with Graves disease, hyperthyroidism.  See treatment above  - US reviewed in clinic and reviewed with patient  - Advise monitor eye symptoms, recommend eye exam yearly   - Recommend OTC trial of selenium report 3 months

## 2024-01-10 RX ORDER — METHIMAZOLE 10 MG/1
10 TABLET ORAL
Qty: 30 TABLET | Refills: 4 | Status: SHIPPED | OUTPATIENT
Start: 2024-01-10

## 2024-03-13 ENCOUNTER — LAB VISIT (OUTPATIENT)
Dept: LAB | Facility: HOSPITAL | Age: 29
End: 2024-03-13
Attending: HOSPITALIST
Payer: COMMERCIAL

## 2024-03-13 DIAGNOSIS — E05.90 HYPERTHYROIDISM, SUBCLINICAL: ICD-10-CM

## 2024-03-13 DIAGNOSIS — E05.00 GRAVES' DISEASE: ICD-10-CM

## 2024-03-13 LAB
T3 SERPL-MCNC: 94 NG/DL (ref 60–180)
T4 FREE SERPL-MCNC: 0.61 NG/DL (ref 0.71–1.51)
TSH SERPL DL<=0.005 MIU/L-ACNC: 3.38 UIU/ML (ref 0.4–4)

## 2024-03-13 PROCEDURE — 84443 ASSAY THYROID STIM HORMONE: CPT | Performed by: HOSPITALIST

## 2024-03-13 PROCEDURE — 84480 ASSAY TRIIODOTHYRONINE (T3): CPT | Performed by: HOSPITALIST

## 2024-03-13 PROCEDURE — 84445 ASSAY OF TSI GLOBULIN: CPT | Performed by: HOSPITALIST

## 2024-03-13 PROCEDURE — 84439 ASSAY OF FREE THYROXINE: CPT | Performed by: HOSPITALIST

## 2024-03-18 LAB — TSI SER-ACNC: 8.61 IU/L

## 2024-03-20 ENCOUNTER — OFFICE VISIT (OUTPATIENT)
Dept: ENDOCRINOLOGY | Facility: CLINIC | Age: 29
End: 2024-03-20
Payer: COMMERCIAL

## 2024-03-20 VITALS
BODY MASS INDEX: 36.44 KG/M2 | SYSTOLIC BLOOD PRESSURE: 116 MMHG | DIASTOLIC BLOOD PRESSURE: 76 MMHG | WEIGHT: 186.63 LBS | HEART RATE: 85 BPM

## 2024-03-20 DIAGNOSIS — E05.90 HYPERTHYROIDISM, SUBCLINICAL: ICD-10-CM

## 2024-03-20 DIAGNOSIS — E05.90 HYPERTHYROIDISM: Primary | ICD-10-CM

## 2024-03-20 DIAGNOSIS — E66.9 OBESITY, UNSPECIFIED CLASSIFICATION, UNSPECIFIED OBESITY TYPE, UNSPECIFIED WHETHER SERIOUS COMORBIDITY PRESENT: ICD-10-CM

## 2024-03-20 DIAGNOSIS — E05.00 GRAVES' DISEASE: ICD-10-CM

## 2024-03-20 PROCEDURE — 99214 OFFICE O/P EST MOD 30 MIN: CPT | Mod: S$GLB,,, | Performed by: HOSPITALIST

## 2024-03-20 PROCEDURE — 1160F RVW MEDS BY RX/DR IN RCRD: CPT | Mod: CPTII,S$GLB,, | Performed by: HOSPITALIST

## 2024-03-20 PROCEDURE — 3078F DIAST BP <80 MM HG: CPT | Mod: CPTII,S$GLB,, | Performed by: HOSPITALIST

## 2024-03-20 PROCEDURE — 3008F BODY MASS INDEX DOCD: CPT | Mod: CPTII,S$GLB,, | Performed by: HOSPITALIST

## 2024-03-20 PROCEDURE — 1159F MED LIST DOCD IN RCRD: CPT | Mod: CPTII,S$GLB,, | Performed by: HOSPITALIST

## 2024-03-20 PROCEDURE — 3074F SYST BP LT 130 MM HG: CPT | Mod: CPTII,S$GLB,, | Performed by: HOSPITALIST

## 2024-03-20 PROCEDURE — 99999 PR PBB SHADOW E&M-EST. PATIENT-LVL III: CPT | Mod: PBBFAC,,, | Performed by: HOSPITALIST

## 2024-03-20 RX ORDER — METHIMAZOLE 5 MG/1
5 TABLET ORAL DAILY
Qty: 90 TABLET | Refills: 3 | Status: SHIPPED | OUTPATIENT
Start: 2024-03-20 | End: 2025-03-20

## 2024-03-20 NOTE — ASSESSMENT & PLAN NOTE
- Patient with positive TSI antibody, hyperthyroidism symptoms started 12/2022.  - Pathophysiology of hyperthyroidism, the role of TSH, free T4 were explained to patient  - No medication contributing, no tobacco use  - ultrasound thyroid 01/2023 read review: Thyroiditis.  No nodules, normal thyroid size  - decrease methimazole to 5 mg daily given TSH and T4 level.    - Repeat lab work in 2 months and 5 months  - All questions were answered.  - no plans for upcoming pregnancy at this time

## 2024-03-20 NOTE — PROGRESS NOTES
"Being a Subjective:      Patient ID: Jyothi Asif is a 29 y.o. female presented to [facility] on 3/20/2024.  Chief Complaint:  Hyperthyroidism    History of Present Illness: Jyothi Asif is a 29 y.o. female here for  hyperthyroidism  No other significant past medical history:   PCP: Daughter of daniel, : Jose Angel Richter-May    Interval history:  Patient is here for hyperthyroidism follow-up.  Doing well.  Compliant with methimazole 10 mg daily   Sleeping better.  Anxiety has improved.  Weight in clinic today 186, previous weight 180,  160   Goiter has improved in size.  Sleeping is better  BMs normal      Hyperthyroidism/Graves disease  - Patient with noted to have suppressed TSH and elevated free T4 12/15/2022: TSH: <0.01, Free T4: 8.3  - Positive for TSI antibody:  Negative TPO: Consistent with Graves disease  - On initial evaluation by me 07/2023:  Will start on ADD.  Hyperthyroidism since 12/2022.  With symptoms below  - with symptoms Weight loss, "lost a lot of weight" decrease from 180> 130 in 1-2 months, Weight now as increase 160   - Did not get ATD  - Family history thyroid disorder: no  - Family history of thyroid cancer: no  - Hx of Thyroid goiter: no  - Tobacco user: no  - Gave birth in 2021  - currently on:  Methimazole 10 mg daily    Current symptoms:  Started 12/2022, ongoing at this time  No   Yes  []    []   Weight gain  []    [x]   Anxiety  []    [x]   Poor Sleep  [x]    []   Hair loss  [x]    []   Brittle nails  []    [x]   Frequent bowel movements, more than usual  []    [x]   Heat intolerance  []    [x]   Palpitation   [x]    []   Vision issue, Dry eyes  [x]    []   Recent severe illness  []    [x]   Neck swelling, pain, pressure    Medications:  [x]    []   Lithium Use  [x]    []   Amiodarone use  [x]    []   Chemotherapy   [x]    []   Radiation Treatment  [x]    []   Estrogen therapy  [x]    []   Supplements:  That includes: Kelp, Iodine, Biotin, " Selenium, Sea garcia, Bladder wrack    Reproductive Issue:  []    []   Galactorrhea  [x]    []   Infertility/abnormal menstruation  []    [x]   Recent pregnancy  []    []   Decreased libido, erectile dysfunction    US thyroid, outside record  1/10/2023  Multiple transverse and sagittal grayscale sonographic images were acquired through the thyroid gland. The right lobe measures 5.7 x 3.0 x 2.9 cm, the left lobe measures 5.5 x 3.3 x 2.9 cm, and the thyroid isthmus is maximally 1.0 cm thick. The entire thyroid gland parenchyma is diffusely heterogeneous with increased vascularity by Doppler. No discrete focal lesions are identified.    IMPRESSION:   Heterogeneous thyroid parenchyma with increased vascularity is consistent with thyroiditis.    Thyroid lab work  Lab Results   Component Value Date    TSH 3.378 03/13/2024    TSH <0.010 (L) 10/27/2023    TSH <0.010 (L) 07/14/2023    FREET4 0.61 (L) 03/13/2024    FREET4 1.15 10/27/2023    FREET4 2.59 (H) 07/14/2023    N3ULXCI 94 03/13/2024    X3GJFII 140 10/27/2023      Antibodies  Lab Results   Component Value Date    THYROPEROXID <6.0 02/06/2017    THYROIDSTIMI 8.61 (H) 03/13/2024    THYROIDSTIMI 11.90 (H) 10/27/2023    THYROIDSTIMI 11.60 (H) 07/14/2023      Reviewed past surgical, medical, family, social history and updated as appropriate.  Review of Systems: see HPI above  Objective:   /76   Pulse 85   Wt 84.6 kg (186 lb 9.6 oz)   BMI 36.44 kg/m²   Body mass index is 36.44 kg/m².  Vital signs reviewed    Physical Exam  Vitals and nursing note reviewed.   Constitutional:       Appearance: Normal appearance. She is well-developed. She is not ill-appearing.   Neck:      Thyroid: No thyromegaly.      Comments: Goiter noted on exam  Pulmonary:      Effort: Pulmonary effort is normal. No respiratory distress.   Musculoskeletal:         General: Normal range of motion.      Cervical back: Normal range of motion.   Neurological:      General: No focal deficit present.       Mental Status: She is alert. Mental status is at baseline.   Psychiatric:         Mood and Affect: Mood normal.         Behavior: Behavior normal.       Lab Reviewed:  See results in subjective  Lab Results   Component Value Date    HGBA1C 5.4 04/04/2017     Lab Results   Component Value Date    CHOL 155 04/04/2017    HDL 50 04/04/2017    LDLCALC 94.8 04/04/2017    TRIG 51 04/04/2017    CHOLHDL 32.3 04/04/2017     Lab Results   Component Value Date     07/14/2023    K 3.9 07/14/2023     07/14/2023    CO2 23 07/14/2023     07/14/2023    BUN 10 07/14/2023    CREATININE 0.6 07/14/2023    CALCIUM 10.0 07/14/2023    PROT 7.3 07/14/2023    ALBUMIN 3.6 07/14/2023    BILITOT 0.4 07/14/2023    ALKPHOS 111 07/14/2023    AST 21 07/14/2023    ALT 28 07/14/2023    ANIONGAP 6 (L) 07/14/2023    ESTGFRAFRICA >105 01/19/2021    EGFRNONAA >60.0 02/06/2017    TSH 3.378 03/13/2024     Assessment     1. Hyperthyroidism  TSH    T4, Free    Thyroid Stimulating Immunoglobulin    methIMAzole (TAPAZOLE) 5 MG Tab    TSH    T4, Free      2. Hyperthyroidism, subclinical        3. Graves' disease  TSH    T4, Free    Thyroid Stimulating Immunoglobulin    methIMAzole (TAPAZOLE) 5 MG Tab      4. Obesity, unspecified classification, unspecified obesity type, unspecified whether serious comorbidity present            Plan     Hyperthyroidism  - Patient with positive TSI antibody, hyperthyroidism symptoms started 12/2022.  - Pathophysiology of hyperthyroidism, the role of TSH, free T4 were explained to patient  - No medication contributing, no tobacco use  - ultrasound thyroid 01/2023 read review: Thyroiditis.  No nodules, normal thyroid size  - decrease methimazole to 5 mg daily given TSH and T4 level.    - Repeat lab work in 2 months and 5 months  - All questions were answered.  - no plans for upcoming pregnancy at this time    Graves' disease  - Patient with Graves disease, hyperthyroidism.  See treatment above  - US reviewed  in clinic and reviewed with patient  - Advise monitor eye symptoms, recommend eye exam yearly     Obesity  - Body mass index is 36.44 kg/m².  - Continue to monitor weight      Advised patient to follow up with PCP for routine health maintenance care.   RTC in pending workup, 4 months with lab work prior if ATD is needed    Adonay Lambert M.D.  Endocrinology  [facility]  3/20/2024      Disclaimer: This note has been generated in part with the use of voice-recognition software. There may be typographical errors that have been missed during proof-reading.

## 2024-03-20 NOTE — ASSESSMENT & PLAN NOTE
- Patient with Graves disease, hyperthyroidism.  See treatment above  - US reviewed in clinic and reviewed with patient  - Advise monitor eye symptoms, recommend eye exam yearly

## 2024-08-13 ENCOUNTER — LAB VISIT (OUTPATIENT)
Dept: LAB | Facility: HOSPITAL | Age: 29
End: 2024-08-13
Attending: HOSPITALIST
Payer: COMMERCIAL

## 2024-08-13 DIAGNOSIS — E05.90 HYPERTHYROIDISM: ICD-10-CM

## 2024-08-13 DIAGNOSIS — E05.00 GRAVES' DISEASE: ICD-10-CM

## 2024-08-13 LAB
T4 FREE SERPL-MCNC: 0.96 NG/DL (ref 0.71–1.51)
TSH SERPL DL<=0.005 MIU/L-ACNC: 0.38 UIU/ML (ref 0.4–4)

## 2024-08-13 PROCEDURE — 84443 ASSAY THYROID STIM HORMONE: CPT | Performed by: HOSPITALIST

## 2024-08-13 PROCEDURE — 84445 ASSAY OF TSI GLOBULIN: CPT | Performed by: HOSPITALIST

## 2024-08-13 PROCEDURE — 84439 ASSAY OF FREE THYROXINE: CPT | Performed by: HOSPITALIST

## 2024-08-13 PROCEDURE — 36415 COLL VENOUS BLD VENIPUNCTURE: CPT | Performed by: HOSPITALIST

## 2024-08-16 LAB — TSI SER-ACNC: 7.64 IU/L

## 2024-08-20 ENCOUNTER — OFFICE VISIT (OUTPATIENT)
Dept: ENDOCRINOLOGY | Facility: CLINIC | Age: 29
End: 2024-08-20
Payer: COMMERCIAL

## 2024-08-20 VITALS
BODY MASS INDEX: 38.04 KG/M2 | HEART RATE: 88 BPM | DIASTOLIC BLOOD PRESSURE: 68 MMHG | WEIGHT: 194.81 LBS | SYSTOLIC BLOOD PRESSURE: 116 MMHG

## 2024-08-20 DIAGNOSIS — E05.00 GRAVES' DISEASE: ICD-10-CM

## 2024-08-20 DIAGNOSIS — E05.90 HYPERTHYROIDISM, SUBCLINICAL: Primary | ICD-10-CM

## 2024-08-20 DIAGNOSIS — E05.90 HYPERTHYROIDISM: ICD-10-CM

## 2024-08-20 PROCEDURE — 3008F BODY MASS INDEX DOCD: CPT | Mod: CPTII,S$GLB,, | Performed by: HOSPITALIST

## 2024-08-20 PROCEDURE — 1159F MED LIST DOCD IN RCRD: CPT | Mod: CPTII,S$GLB,, | Performed by: HOSPITALIST

## 2024-08-20 PROCEDURE — 99214 OFFICE O/P EST MOD 30 MIN: CPT | Mod: S$GLB,,, | Performed by: HOSPITALIST

## 2024-08-20 PROCEDURE — 3074F SYST BP LT 130 MM HG: CPT | Mod: CPTII,S$GLB,, | Performed by: HOSPITALIST

## 2024-08-20 PROCEDURE — 99999 PR PBB SHADOW E&M-EST. PATIENT-LVL III: CPT | Mod: PBBFAC,,, | Performed by: HOSPITALIST

## 2024-08-20 PROCEDURE — 3078F DIAST BP <80 MM HG: CPT | Mod: CPTII,S$GLB,, | Performed by: HOSPITALIST

## 2024-08-20 RX ORDER — METHIMAZOLE 5 MG/1
5 TABLET ORAL DAILY
Qty: 90 TABLET | Refills: 3 | Status: SHIPPED | OUTPATIENT
Start: 2024-08-20 | End: 2025-08-20

## 2024-08-20 NOTE — PROGRESS NOTES
"Being a Subjective:      Patient ID: Jyothi Asif is a 29 y.o. female presented to [facility] on 8/20/2024.  Chief Complaint:  Hyperthyroidism    History of Present Illness: Jyothi Asif is a 29 y.o. female here for  hyperthyroidism  No other significant past medical history:   PCP: Daughter of daniel, : Jose Angel Richter-May    Interval history:  Patient is here for hyperthyroidism follow-up.   Has been issue with her pharmacy, when a few days without her methimazole.  When off methimazole patient reported symptoms of hyperthyroidism returning   Patient should be on methimazole 5 mg daily.  Needs to change her pharmacy to get medication.    Weight in clinic today 194, previous weight 186, 180,  160   Goiter has improved in size.  Sleeping is better  BMs normal      Hyperthyroidism/Graves disease  - Patient with noted to have suppressed TSH and elevated free T4 12/15/2022: TSH: <0.01, Free T4: 8.3  - Positive for TSI antibody:  Negative TPO: Consistent with Graves disease  - On initial evaluation by me 07/2023:  Will start on ADD.  Hyperthyroidism since 12/2022.  With symptoms below  - with symptoms Weight loss, "lost a lot of weight" decrease from 180> 130 in 1-2 months, Weight now as increase  - Family history thyroid disorder: no  - Family history of thyroid cancer: no  - Hx of Thyroid goiter: no  - Tobacco user: no  - Gave birth in 2021  - patient was started on methimazole 10 mg 7/28/2023, dose decreased to 5 mg daily 03/2024  - Currently on: Methimazole 5 mg daily    Hyperthyroidism symptoms as reported 12/2022  No   Yes  []    []   Weight gain  []    [x]   Anxiety  []    [x]   Poor Sleep  [x]    []   Hair loss  [x]    []   Brittle nails  []    [x]   Frequent bowel movements, more than usual  []    [x]   Heat intolerance  []    [x]   Palpitation   [x]    []   Vision issue, Dry eyes  [x]    []   Recent severe illness  []    [x]   Neck swelling, pain, " pressure    Medications:  [x]    []   Lithium Use  [x]    []   Amiodarone use  [x]    []   Chemotherapy   [x]    []   Radiation Treatment  [x]    []   Estrogen therapy  [x]    []   Supplements:  That includes: Kelp, Iodine, Biotin, Selenium, Sea garcia, Bladder wrack    Reproductive Issue:  []    []   Galactorrhea  [x]    []   Infertility/abnormal menstruation  []    [x]   Recent pregnancy  []    []   Decreased libido, erectile dysfunction    US thyroid, outside record  1/10/2023  Multiple transverse and sagittal grayscale sonographic images were acquired through the thyroid gland. The right lobe measures 5.7 x 3.0 x 2.9 cm, the left lobe measures 5.5 x 3.3 x 2.9 cm, and the thyroid isthmus is maximally 1.0 cm thick. The entire thyroid gland parenchyma is diffusely heterogeneous with increased vascularity by Doppler. No discrete focal lesions are identified.    IMPRESSION:   Heterogeneous thyroid parenchyma with increased vascularity is consistent with thyroiditis.    Thyroid lab work  Lab Results   Component Value Date    TSH 0.378 (L) 08/13/2024    TSH 3.378 03/13/2024    TSH <0.010 (L) 10/27/2023    FREET4 0.96 08/13/2024    FREET4 0.61 (L) 03/13/2024    FREET4 1.15 10/27/2023    J1YZEXR 94 03/13/2024    H9UMPTC 140 10/27/2023      Antibodies  Lab Results   Component Value Date    THYROPEROXID <6.0 02/06/2017    THYROIDSTIMI 7.64 (H) 08/13/2024    THYROIDSTIMI 8.61 (H) 03/13/2024    THYROIDSTIMI 11.90 (H) 10/27/2023            Reviewed past surgical, medical, family, social history and updated as appropriate.  Review of Systems: see HPI above  Objective:   /68   Pulse 88   Wt 88.4 kg (194 lb 12.8 oz)   BMI 38.04 kg/m²   Body mass index is 38.04 kg/m².  Vital signs reviewed    Physical Exam  Vitals and nursing note reviewed.   Constitutional:       Appearance: Normal appearance. She is well-developed. She is not ill-appearing.   Neck:      Thyroid: No thyromegaly.      Comments: Goiter noted on  exam  Pulmonary:      Effort: Pulmonary effort is normal. No respiratory distress.   Musculoskeletal:         General: Normal range of motion.      Cervical back: Normal range of motion.   Neurological:      General: No focal deficit present.      Mental Status: She is alert. Mental status is at baseline.   Psychiatric:         Mood and Affect: Mood normal.         Behavior: Behavior normal.     Lab Reviewed:  See results in subjective  Lab Results   Component Value Date    HGBA1C 5.4 04/04/2017     Lab Results   Component Value Date    CHOL 155 04/04/2017    HDL 50 04/04/2017    LDLCALC 94.8 04/04/2017    TRIG 51 04/04/2017    CHOLHDL 32.3 04/04/2017     Lab Results   Component Value Date     07/14/2023    K 3.9 07/14/2023     07/14/2023    CO2 23 07/14/2023     07/14/2023    BUN 10 07/14/2023    CREATININE 0.6 07/14/2023    CALCIUM 10.0 07/14/2023    PROT 7.3 07/14/2023    ALBUMIN 3.6 07/14/2023    BILITOT 0.4 07/14/2023    ALKPHOS 111 07/14/2023    AST 21 07/14/2023    ALT 28 07/14/2023    ANIONGAP 6 (L) 07/14/2023    EGFRNORACEVR >60.0 07/14/2023    TSH 0.378 (L) 08/13/2024     Assessment     1. Hyperthyroidism, subclinical  TSH    T4, Free    Thyroid Stimulating Immunoglobulin      2. Graves' disease  Thyroid Stimulating Immunoglobulin    methIMAzole (TAPAZOLE) 5 MG Tab      3. Hyperthyroidism  methIMAzole (TAPAZOLE) 5 MG Tab            Plan     Hyperthyroidism  - Patient with positive TSI antibody, hyperthyroidism symptoms started 12/2022.  - No medication contributing, no tobacco use  - Ultrasound thyroid 01/2023 read review: Thyroiditis.  No nodules, normal thyroid size  - I reviewed lab work trends: TSH, Free T4 with patient in clinic today 8/20/2024   - CONTINUE: Methimazole  5 mg daily, new prescription sent to her new pharmacy to ensure continue refills.  Advise long-term compliance  - Repeat lab work in 4 months  - No plans for upcoming pregnancy at this time, discuss to contact  endocrinology for adjustment to PTU at that time  - Recommend OTC vitamin D, recommen selenium  - All questions were answered    Graves' disease  - Patient with Graves disease, hyperthyroidism.  See treatment above  - Continue positive antibody, she is aware to not stop methimazole abruptly  - US reviewed in clinic and reviewed with patient  - Advise monitor eye symptoms, recommend eye exam yearly   - Recommend OTC vitamin D, recommen selenium      Advised patient to follow up with PCP for routine health maintenance care.   RTC in pending workup, 4 months with lab work prior if ATD is needed    Adonay Lambert M.D.  Endocrinology  [facility]  8/20/2024      Disclaimer: This note has been generated in part with the use of voice-recognition software. There may be typographical errors that have been missed during proof-reading.

## 2024-08-20 NOTE — ASSESSMENT & PLAN NOTE
- Patient with positive TSI antibody, hyperthyroidism symptoms started 12/2022.  - No medication contributing, no tobacco use  - Ultrasound thyroid 01/2023 read review: Thyroiditis.  No nodules, normal thyroid size  - I reviewed lab work trends: TSH, Free T4 with patient in clinic today 8/20/2024   - CONTINUE: Methimazole  5 mg daily, new prescription sent to her new pharmacy to ensure continue refills.  Advise long-term compliance  - Repeat lab work in 4 months  - No plans for upcoming pregnancy at this time, discuss to contact endocrinology for adjustment to PTU at that time  - Recommend OTC vitamin D, recommen selenium  - All questions were answered

## 2024-08-20 NOTE — ASSESSMENT & PLAN NOTE
- Patient with Graves disease, hyperthyroidism.  See treatment above  - Continue positive antibody, she is aware to not stop methimazole abruptly  - US reviewed in clinic and reviewed with patient  - Advise monitor eye symptoms, recommend eye exam yearly   - Recommend OTC vitamin D, recommen selenium

## 2025-01-10 ENCOUNTER — LAB VISIT (OUTPATIENT)
Dept: LAB | Facility: HOSPITAL | Age: 30
End: 2025-01-10
Attending: HOSPITALIST
Payer: COMMERCIAL

## 2025-01-10 DIAGNOSIS — E05.90 HYPERTHYROIDISM, SUBCLINICAL: ICD-10-CM

## 2025-01-10 DIAGNOSIS — E05.00 GRAVES' DISEASE: ICD-10-CM

## 2025-01-10 LAB
T4 FREE SERPL-MCNC: 0.89 NG/DL (ref 0.71–1.51)
TSH SERPL DL<=0.005 MIU/L-ACNC: 1.13 UIU/ML (ref 0.4–4)

## 2025-01-10 PROCEDURE — 84443 ASSAY THYROID STIM HORMONE: CPT | Performed by: HOSPITALIST

## 2025-01-10 PROCEDURE — 84445 ASSAY OF TSI GLOBULIN: CPT | Performed by: HOSPITALIST

## 2025-01-10 PROCEDURE — 84439 ASSAY OF FREE THYROXINE: CPT | Performed by: HOSPITALIST

## 2025-01-10 PROCEDURE — 36415 COLL VENOUS BLD VENIPUNCTURE: CPT | Performed by: HOSPITALIST

## 2025-01-13 LAB — TSI SER-ACNC: 5.99 IU/L

## 2025-01-22 ENCOUNTER — TELEPHONE (OUTPATIENT)
Dept: ENDOCRINOLOGY | Facility: CLINIC | Age: 30
End: 2025-01-22
Payer: COMMERCIAL

## 2025-01-23 ENCOUNTER — OFFICE VISIT (OUTPATIENT)
Dept: ENDOCRINOLOGY | Facility: CLINIC | Age: 30
End: 2025-01-23
Payer: COMMERCIAL

## 2025-01-23 DIAGNOSIS — E55.9 VITAMIN D DEFICIENCY: ICD-10-CM

## 2025-01-23 DIAGNOSIS — E05.00 GRAVES' DISEASE: ICD-10-CM

## 2025-01-23 DIAGNOSIS — E66.812 CLASS 2 OBESITY WITH BODY MASS INDEX (BMI) OF 36.0 TO 36.9 IN ADULT, UNSPECIFIED OBESITY TYPE, UNSPECIFIED WHETHER SERIOUS COMORBIDITY PRESENT: ICD-10-CM

## 2025-01-23 DIAGNOSIS — E05.90 HYPERTHYROIDISM: Primary | ICD-10-CM

## 2025-01-23 RX ORDER — METHIMAZOLE 5 MG/1
5 TABLET ORAL DAILY
Qty: 90 TABLET | Refills: 3 | Status: SHIPPED | OUTPATIENT
Start: 2025-01-23 | End: 2026-01-23

## 2025-01-23 NOTE — ASSESSMENT & PLAN NOTE
- Lab work reviewed, and discussed with patient in clinic  - Vitamin-D goal greater than >30  - Recommend OTC VitD3 2000iu daily  - lab work every 6 mo

## 2025-01-23 NOTE — ASSESSMENT & PLAN NOTE
- Patient with Graves disease, hyperthyroidism.  See treatment above  - Continue positive antibody, she is aware to not stop methimazole abruptly  - US reviewed in clinic and reviewed with patient  - Advise monitor eye symptoms, recommend eye exam yearly   - Recommended a gluten-free diet due to its potential association with autoimmune conditions of the small intestine.  - Recommended an anti-inflammatory diet for long-term thyroid and immune health.  - Recommended over-the-counter Vitamin D3 supplements for immune support.

## 2025-01-23 NOTE — PROGRESS NOTES
The patient location is: [place], [place]    The chief complaint leading to consultation is: hyperthyrodism    Visit type: audiovisual    Face to Face time with patient: 10 mins  33 minutes of total time spent on the encounter, which includes face to face time and non-face to face time preparing to see the patient (eg, review of tests), Obtaining and/or reviewing separately obtained history, Documenting clinical information in the electronic or other health record, Independently interpreting results (not separately reported) and communicating results to the patient/family/caregiver, or Care coordination (not separately reported).     Each patient to whom he or she provides medical services by telemedicine is:  (1) informed of the relationship between the physician and patient and the respective role of any other health care provider with respect to management of the patient; and (2) notified that he or she may decline to receive medical services by telemedicine and may withdraw from such care at any time.    Subjective:      Patient ID: Jyothi Asif is a 30 y.o. female presented to [facility] on 1/23/2025.  Chief Complaint:  No chief complaint on file.    History of Present Illness: Jyothi Asif is a 30 y.o. female here for  hyperthyroidism  No other significant past medical history:   PCP: Daughter of Dr daniel: Jose Angel Richter-May    Interval history:  Patient is here for hyperthyroidism follow-up.   Patient on methimazole 5 mg daily.  Compliance with medication  Weight fluctuation  No hyperthyroidism symptoms  Sleeping is better  BMs normal    Hyperthyroidism/Graves disease  - Patient with noted to have suppressed TSH and elevated free T4 12/15/2022: TSH: <0.01, Free T4: 8.3  - Positive for TSI antibody:  Negative TPO: Consistent with Graves disease  - On initial evaluation by me 07/2023:  Will start on ADD.  Hyperthyroidism since 12/2022.  With symptoms below  - with  "symptoms Weight loss, "lost a lot of weight" decrease from 180> 130 in 1-2 months, Weight now as increase  - Family history thyroid disorder: no. No other family with autoimmune issue  - Family history of thyroid cancer: no  - Hx of Thyroid goiter: no  - Tobacco user: no  - Gave birth in 2021  - patient was started on methimazole 10 mg 7/28/2023, dose decreased to 5 mg daily 03/2024  - Currently on: Methimazole 5 mg daily    Hyperthyroidism symptoms as reported 12/2022  No   Yes  []    []   Weight gain  []    [x]   Anxiety  []    [x]   Poor Sleep  [x]    []   Hair loss  [x]    []   Brittle nails  []    [x]   Frequent bowel movements, more than usual  []    [x]   Heat intolerance  []    [x]   Palpitation   [x]    []   Vision issue, Dry eyes  [x]    []   Recent severe illness  []    [x]   Neck swelling, pain, pressure    Medications:  [x]    []   Lithium Use  [x]    []   Amiodarone use  [x]    []   Chemotherapy   [x]    []   Radiation Treatment  [x]    []   Estrogen therapy  [x]    []   Supplements:  That includes: Kelp, Iodine, Biotin, Selenium, Sea garcia, Bladder wrack    Reproductive Issue:  []    []   Galactorrhea  [x]    []   Infertility/abnormal menstruation  []    [x]   Recent pregnancy  []    []   Decreased libido, erectile dysfunction    US thyroid, outside record  1/10/2023  Multiple transverse and sagittal grayscale sonographic images were acquired through the thyroid gland. The right lobe measures 5.7 x 3.0 x 2.9 cm, the left lobe measures 5.5 x 3.3 x 2.9 cm, and the thyroid isthmus is maximally 1.0 cm thick. The entire thyroid gland parenchyma is diffusely heterogeneous with increased vascularity by Doppler. No discrete focal lesions are identified.    IMPRESSION:   Heterogeneous thyroid parenchyma with increased vascularity is consistent with thyroiditis.    Thyroid lab work  Lab Results   Component Value Date    TSH 1.131 01/10/2025    TSH 0.378 (L) 08/13/2024    TSH 3.378 03/13/2024    FREET4 0.89 " 01/10/2025    FREET4 0.96 08/13/2024    FREET4 0.61 (L) 03/13/2024    Y2PGAXZ 94 03/13/2024    U4DXHCQ 140 10/27/2023      Antibodies  Lab Results   Component Value Date    THYROPEROXID <6.0 02/06/2017    THYROIDSTIMI 5.99 (H) 01/10/2025    THYROIDSTIMI 7.64 (H) 08/13/2024    THYROIDSTIMI 8.61 (H) 03/13/2024         Reviewed past surgical, medical, family, social history and updated as appropriate.  Review of Systems: see HPI above  Objective:   There were no vitals taken for this visit.  There is no height or weight on file to calculate BMI.  Vital signs reviewed    Physical Exam  Lab Reviewed:  See results in subjective  Lab Results   Component Value Date    HGBA1C 5.4 04/04/2017     Lab Results   Component Value Date    CHOL 155 04/04/2017    HDL 50 04/04/2017    LDLCALC 94.8 04/04/2017    TRIG 51 04/04/2017    CHOLHDL 32.3 04/04/2017     Lab Results   Component Value Date     07/14/2023    K 3.9 07/14/2023     07/14/2023    CO2 23 07/14/2023     07/14/2023    BUN 10 07/14/2023    CREATININE 0.6 07/14/2023    CALCIUM 10.0 07/14/2023    PROT 7.3 07/14/2023    ALBUMIN 3.6 07/14/2023    BILITOT 0.4 07/14/2023    ALKPHOS 111 07/14/2023    AST 21 07/14/2023    ALT 28 07/14/2023    ANIONGAP 6 (L) 07/14/2023    EGFRNORACEVR >60.0 07/14/2023    TSH 1.131 01/10/2025     Assessment     1. Hyperthyroidism  methIMAzole (TAPAZOLE) 5 MG Tab    TSH    T4, Free    T3      2. Graves' disease  methIMAzole (TAPAZOLE) 5 MG Tab    Thyroid Stimulating Immunoglobulin      3. Vitamin D deficiency  Vitamin D      4. Class 2 obesity with body mass index (BMI) of 36.0 to 36.9 in adult, unspecified obesity type, unspecified whether serious comorbidity present            Plan     Hyperthyroidism  - Patient with positive TSI antibody, hyperthyroidism symptoms started 12/2022.  - No medication contributing, no tobacco use  - Ultrasound thyroid 01/2023 read review: Thyroiditis.  No nodules, normal thyroid size  - I reviewed  lab work trends: TSH, Free T4 with patient in clinic today 1/23/2025   - CONTINUE: Methimazole  5 mg daily, new prescription sent to her new pharmacy to ensure continue refills.  Advise long-term compliance given TSI elevation, unable to stop  - Repeat lab work in  6 months  - No plans for upcoming pregnancy at this time, discuss to contact endocrinology for adjustment to PTU at that time  - Recommend OTC vitamin D, recommen selenium  - All questions were answered    Graves' disease  - Patient with Graves disease, hyperthyroidism.  See treatment above  - Continue positive antibody, she is aware to not stop methimazole abruptly  - US reviewed in clinic and reviewed with patient  - Advise monitor eye symptoms, recommend eye exam yearly   - Recommended a gluten-free diet due to its potential association with autoimmune conditions of the small intestine.  - Recommended an anti-inflammatory diet for long-term thyroid and immune health.  - Recommended over-the-counter Vitamin D3 supplements for immune support.    Vitamin D deficiency  - Lab work reviewed, and discussed with patient in clinic  - Vitamin-D goal greater than >30  - Recommend OTC VitD3 2000iu daily  - lab work every 6 mo    Obesity  - There is no height or weight on file to calculate BMI. BMI: 36  - Continue to monitor weight        Advised patient to follow up with PCP for routine health maintenance care.   RTC in pending workup, 6 months with lab work prior if ATD is needed    Visit today included increased complexity associated with the care of the episodic problem addressed and managing the longitudinal care of the patient due to the serious and/or complex managed problem(s).   Including:  Hyperthyroidism, graves disease, obesity, vitamin-D deficiency    Adonay Lambert M.D.  Endocrinology  [facility]  1/23/2025      Disclaimer: This note has been generated in part with the use of voice-recognition software. There may be  typographical errors that have been missed during proof-reading.

## 2025-01-23 NOTE — ASSESSMENT & PLAN NOTE
- Patient with positive TSI antibody, hyperthyroidism symptoms started 12/2022.  - No medication contributing, no tobacco use  - Ultrasound thyroid 01/2023 read review: Thyroiditis.  No nodules, normal thyroid size  - I reviewed lab work trends: TSH, Free T4 with patient in clinic today 1/23/2025   - CONTINUE: Methimazole  5 mg daily, new prescription sent to her new pharmacy to ensure continue refills.  Advise long-term compliance given TSI elevation, unable to stop  - Repeat lab work in  6 months  - No plans for upcoming pregnancy at this time, discuss to contact endocrinology for adjustment to PTU at that time  - Recommend OTC vitamin D, recommen selenium  - All questions were answered